# Patient Record
Sex: FEMALE | Race: WHITE | NOT HISPANIC OR LATINO | Employment: FULL TIME | URBAN - METROPOLITAN AREA
[De-identification: names, ages, dates, MRNs, and addresses within clinical notes are randomized per-mention and may not be internally consistent; named-entity substitution may affect disease eponyms.]

---

## 2018-01-15 NOTE — MISCELLANEOUS
Message   Recorded as Task   Date: 07/19/2016 03:31 PM, Created By: Elita Hamman   Task Name: Med Renewal Request   Assigned To: Saima Cisneros   Regarding Patient: Sumaya Gresham, Status: Active   Comment:    Mariel Paulino - 19 Jul 2016 3:31 PM     TASK CREATED  Caller: Self; Renew Medication; [phone] (Home)  pt requesting refills on Lo Loestrin 24 sent to the [facility] pharmacy-scheduled for 8/4 with ABDIAS Guallpa pt is @[phone]  Edwige Body - 19 Jul 2016 3:43 PM     TASK EDITED                 sent to pharm        Active Problems    1  Encounter for routine gynecological examination (V72 31) (Z01 419)   2  Oral contraceptive prescribed (V25 01) (Z30 011)   3  Pap smear, as part of routine gynecological examination (V76 2) (Z01 419)    Current Meds   1  Cymbalta 30 MG Oral Capsule Delayed Release Particles (DULoxetine HCl); Therapy: [id number] to (Evaluate:[id number]) Recorded   2  Lo Loestrin Fe 1 MG-10 MCG / 10 MCG Oral Tablet; TAKE 1 TABLET DAILY  BRAND   NECESSARY; Therapy: [id number] to (Evaluate:[id number])  Requested for: [id number]; Last   Rx:[id number] Ordered    Allergies    1  No Known Drug Allergies    Plan  Oral contraceptive prescribed    · Lo Loestrin Fe 1 MG-10 MCG / 10 MCG Oral Tablet; TAKE 1 TABLET  DAILY  BRAND NECESSARY    Signatures   Electronically signed by : Ginette Jung, ; Jul 19 2016  3:43PM EST                       (Author)

## 2018-03-06 ENCOUNTER — APPOINTMENT (EMERGENCY)
Dept: RADIOLOGY | Facility: HOSPITAL | Age: 43
End: 2018-03-06
Payer: COMMERCIAL

## 2018-03-06 ENCOUNTER — HOSPITAL ENCOUNTER (EMERGENCY)
Facility: HOSPITAL | Age: 43
Discharge: HOME | End: 2018-03-06
Admitting: EMERGENCY MEDICINE
Payer: COMMERCIAL

## 2018-03-06 VITALS
OXYGEN SATURATION: 98 % | HEIGHT: 61 IN | SYSTOLIC BLOOD PRESSURE: 111 MMHG | WEIGHT: 104 LBS | BODY MASS INDEX: 19.63 KG/M2 | RESPIRATION RATE: 18 BRPM | DIASTOLIC BLOOD PRESSURE: 60 MMHG | TEMPERATURE: 98 F | HEART RATE: 68 BPM

## 2018-03-06 DIAGNOSIS — R42 DIZZINESS: Primary | ICD-10-CM

## 2018-03-06 LAB
ANION GAP SERPL CALCULATED.3IONS-SCNC: 7 MMOL/L (ref 4–13)
BASOPHILS # BLD AUTO: 0 THOUSANDS/ΜL (ref 0–0.1)
BASOPHILS NFR BLD AUTO: 1 % (ref 0–1)
BUN SERPL-MCNC: 14 MG/DL (ref 5–25)
CALCIUM SERPL-MCNC: 8.7 MG/DL (ref 8.3–10.1)
CHLORIDE SERPL-SCNC: 107 MMOL/L (ref 100–108)
CO2 SERPL-SCNC: 30 MMOL/L (ref 21–32)
CREAT SERPL-MCNC: 0.61 MG/DL (ref 0.6–1.3)
EOSINOPHIL # BLD AUTO: 0 THOUSAND/ΜL (ref 0–0.61)
EOSINOPHIL NFR BLD AUTO: 1 % (ref 0–6)
ERYTHROCYTE [DISTWIDTH] IN BLOOD BY AUTOMATED COUNT: 11.8 % (ref 11.6–15.1)
EXT PREG TEST URINE: NEGATIVE
GFR SERPL CREATININE-BSD FRML MDRD: 113 ML/MIN/1.73SQ M
GLUCOSE SERPL-MCNC: 94 MG/DL (ref 65–140)
HCT VFR BLD AUTO: 39.2 % (ref 37–47)
HGB BLD-MCNC: 13.2 G/DL (ref 12–16)
LYMPHOCYTES # BLD AUTO: 1.3 THOUSANDS/ΜL (ref 0.6–4.47)
LYMPHOCYTES NFR BLD AUTO: 26 % (ref 14–44)
MCH RBC QN AUTO: 31.4 PG (ref 27–31)
MCHC RBC AUTO-ENTMCNC: 33.6 G/DL (ref 31.4–37.4)
MCV RBC AUTO: 94 FL (ref 82–98)
MONOCYTES # BLD AUTO: 0.4 THOUSAND/ΜL (ref 0.17–1.22)
MONOCYTES NFR BLD AUTO: 8 % (ref 4–12)
NEUTROPHILS # BLD AUTO: 3.3 THOUSANDS/ΜL (ref 1.85–7.62)
NEUTS SEG NFR BLD AUTO: 65 % (ref 43–75)
NRBC BLD AUTO-RTO: 0 /100 WBCS
PLATELET # BLD AUTO: 199 THOUSANDS/UL (ref 130–400)
PMV BLD AUTO: 8.8 FL (ref 8.9–12.7)
POTASSIUM SERPL-SCNC: 3.4 MMOL/L (ref 3.5–5.3)
RBC # BLD AUTO: 4.19 MILLION/UL (ref 4.2–5.4)
SODIUM SERPL-SCNC: 144 MMOL/L (ref 136–145)
TROPONIN I SERPL-MCNC: <0.02 NG/ML
WBC # BLD AUTO: 5.1 THOUSAND/UL (ref 4.8–10.8)

## 2018-03-06 PROCEDURE — 70450 CT HEAD/BRAIN W/O DYE: CPT

## 2018-03-06 PROCEDURE — 96374 THER/PROPH/DIAG INJ IV PUSH: CPT

## 2018-03-06 PROCEDURE — 99284 EMERGENCY DEPT VISIT MOD MDM: CPT

## 2018-03-06 PROCEDURE — 93005 ELECTROCARDIOGRAM TRACING: CPT | Performed by: PHYSICIAN ASSISTANT

## 2018-03-06 PROCEDURE — 85025 COMPLETE CBC W/AUTO DIFF WBC: CPT | Performed by: PHYSICIAN ASSISTANT

## 2018-03-06 PROCEDURE — 84484 ASSAY OF TROPONIN QUANT: CPT | Performed by: PHYSICIAN ASSISTANT

## 2018-03-06 PROCEDURE — 36415 COLL VENOUS BLD VENIPUNCTURE: CPT | Performed by: PHYSICIAN ASSISTANT

## 2018-03-06 PROCEDURE — 96361 HYDRATE IV INFUSION ADD-ON: CPT

## 2018-03-06 PROCEDURE — 81025 URINE PREGNANCY TEST: CPT | Performed by: PHYSICIAN ASSISTANT

## 2018-03-06 PROCEDURE — 80048 BASIC METABOLIC PNL TOTAL CA: CPT | Performed by: PHYSICIAN ASSISTANT

## 2018-03-06 RX ORDER — DIAZEPAM 5 MG/ML
2.5 INJECTION, SOLUTION INTRAMUSCULAR; INTRAVENOUS ONCE
Status: COMPLETED | OUTPATIENT
Start: 2018-03-06 | End: 2018-03-06

## 2018-03-06 RX ORDER — MECLIZINE HYDROCHLORIDE 25 MG/1
25 TABLET ORAL 3 TIMES DAILY PRN
Qty: 15 TABLET | Refills: 0 | Status: SHIPPED | OUTPATIENT
Start: 2018-03-06 | End: 2018-03-11

## 2018-03-06 RX ORDER — MECLIZINE HYDROCHLORIDE 25 MG/1
25 TABLET ORAL ONCE
Status: COMPLETED | OUTPATIENT
Start: 2018-03-06 | End: 2018-03-06

## 2018-03-06 RX ORDER — DULOXETIN HYDROCHLORIDE 30 MG/1
30 CAPSULE, DELAYED RELEASE ORAL DAILY
COMMUNITY

## 2018-03-06 RX ORDER — FLUTICASONE PROPIONATE 50 MCG
1 SPRAY, SUSPENSION (ML) NASAL DAILY
Qty: 16 G | Refills: 0 | Status: SHIPPED | OUTPATIENT
Start: 2018-03-06

## 2018-03-06 RX ADMIN — MECLIZINE HYDROCHLORIDE 25 MG: 25 TABLET ORAL at 11:43

## 2018-03-06 RX ADMIN — DIAZEPAM 2.5 MG: 5 INJECTION, SOLUTION INTRAMUSCULAR; INTRAVENOUS at 13:18

## 2018-03-06 RX ADMIN — SODIUM CHLORIDE 1000 ML: 0.9 INJECTION, SOLUTION INTRAVENOUS at 11:56

## 2018-03-06 NOTE — ED PROVIDER NOTES
History  Chief Complaint   Patient presents with    Dizziness     patient c/o dizziness, nausea and vomiting starting last night  45-year-old female, history of anxiety, presenting today with dizziness that started yesterday accompanied with nausea and vomiting as well as right-sided ear fullness  Relates that she has had these symptoms before in the past however this has worsened  Has been to ear nose and throat doctor however has not been in some time  Believes that she has eustachian tube dysfunction  Minimal amount of ringing in the ears  States that this is been occurring on and off over the past week usually worse in the morning however started once again last evening and has been persistent, significantly worsened with any type of head turning  Able to ambulate however feels off balance at times  Has not had any falls  Denies changes in vision, weakness, numbness, paresthesias, chest pain, shortness of breath, calf pain or swelling, headache, neck pain or stiffness  Prior to Admission Medications   Prescriptions Last Dose Informant Patient Reported? Taking? DULoxetine (CYMBALTA) 30 mg delayed release capsule   Yes Yes   Sig: Take 30 mg by mouth daily      Facility-Administered Medications: None       Past Medical History:   Diagnosis Date    Anxiety        History reviewed  No pertinent surgical history  History reviewed  No pertinent family history  I have reviewed and agree with the history as documented  Social History   Substance Use Topics    Smoking status: Never Smoker    Smokeless tobacco: Never Used    Alcohol use No        Review of Systems   Constitutional: Negative  HENT: Negative  Eyes: Negative  Respiratory: Negative  Cardiovascular: Negative  Gastrointestinal: Positive for nausea and vomiting  Negative for abdominal distention, abdominal pain, anal bleeding, blood in stool, constipation, diarrhea and rectal pain     Genitourinary: Negative  Musculoskeletal: Negative  Skin: Negative  Neurological: Positive for dizziness  Negative for tremors, seizures, syncope, facial asymmetry, speech difficulty, weakness, light-headedness, numbness and headaches  All other systems reviewed and are negative  Physical Exam  ED Triage Vitals [03/06/18 1119]   Temperature Pulse Respirations Blood Pressure SpO2   98 °F (36 7 °C) 68 18 111/60 98 %      Temp Source Heart Rate Source Patient Position - Orthostatic VS BP Location FiO2 (%)   Tympanic Monitor Lying Left arm --      Pain Score       8           Orthostatic Vital Signs  Vitals:    03/06/18 1119   BP: 111/60   Pulse: 68   Patient Position - Orthostatic VS: Lying       Physical Exam   Constitutional: She is oriented to person, place, and time  She appears well-developed and well-nourished  Ambulating in the emergency department after treatment without difficulty  No abnormal gait noted   HENT:   Head: Normocephalic and atraumatic  Right Ear: External ear normal    Left Ear: External ear normal    Nose: Nose normal    Mouth/Throat: Oropharynx is clear and moist    Eyes: Conjunctivae and EOM are normal  Pupils are equal, round, and reactive to light  Right eye exhibits no discharge  Left eye exhibits no discharge  No scleral icterus  Bilateral horizontal nystagmus noted  There is no rotary or vertical nystagmus   Neck: Normal range of motion  Neck supple  Cardiovascular: Normal rate, regular rhythm, normal heart sounds and intact distal pulses  Exam reveals no gallop and no friction rub  No murmur heard  Pulmonary/Chest: Effort normal and breath sounds normal  No respiratory distress  She has no wheezes  She has no rales  She exhibits no tenderness  spo2 is 98% indicating adequate oxygenation  Abdominal: Soft  Bowel sounds are normal  She exhibits no distension and no mass  There is no tenderness  There is no rebound and no guarding  No hernia     Neurological: She is alert and oriented to person, place, and time  She has normal strength  No cranial nerve deficit or sensory deficit  She displays a negative Romberg sign  GCS eye subscore is 4  GCS verbal subscore is 5  GCS motor subscore is 6  Good point to point test    Skin: Skin is warm and dry  Capillary refill takes less than 2 seconds  Nursing note and vitals reviewed  ED Medications  Medications   sodium chloride 0 9 % bolus 1,000 mL (0 mL Intravenous Stopped 3/6/18 1351)   meclizine (ANTIVERT) tablet 25 mg (25 mg Oral Given 3/6/18 1143)   diazepam (VALIUM) injection 2 5 mg (2 5 mg Intravenous Given 3/6/18 1318)       Diagnostic Studies  Results Reviewed     Procedure Component Value Units Date/Time    POCT pregnancy, urine [[id number]]  (Normal) Resulted:  03/06/18 1229    Lab Status:  Final result Updated:  03/06/18 1229     EXT PREG TEST UR (Ref: Negative) negative    Troponin I [[id number]]  (Normal) Collected:  03/06/18 1153    Lab Status:  Final result Specimen:  Blood from Arm, Left Updated:  03/06/18 1228     Troponin I <0 02 ng/mL     Narrative:         [employer] Chemistry analyzer 99% cutoff is > 0 04 ng/mL in network labs    o cTnI 99% cutoff is useful only when applied to patients in the clinical setting of myocardial ischemia  o cTnI 99% cutoff should be interpreted in the context of clinical history, ECG findings and possibly cardiac imaging to establish correct diagnosis  o cTnI 99% cutoff may be suggestive but clearly not indicative of a coronary event without the clinical setting of myocardial ischemia      Basic metabolic panel [[id number]]  (Abnormal) Collected:  03/06/18 1153    Lab Status:  Final result Specimen:  Blood from Arm, Left Updated:  03/06/18 1218     Sodium 144 mmol/L      Potassium 3 4 (L) mmol/L      Chloride 107 mmol/L      CO2 30 mmol/L      Anion Gap 7 mmol/L      BUN 14 mg/dL      Creatinine 0 61 mg/dL      Glucose 94 mg/dL      Calcium 8 7 mg/dL      eGFR 113 ml/min/1 73sq m Narrative:         National Kidney Disease Education Program recommendations are as follows:  GFR calculation is accurate only with a steady state creatinine  Chronic Kidney disease less than 60 ml/min/1 73 sq  meters  Kidney failure less than 15 ml/min/1 73 sq  meters  CBC and differential [[id number]]  (Abnormal) Collected:  03/06/18 1153    Lab Status:  Final result Specimen:  Blood from Arm, Left Updated:  03/06/18 1203     WBC 5 10 Thousand/uL      RBC 4 19 (L) Million/uL      Hemoglobin 13 2 g/dL      Hematocrit 39 2 %      MCV 94 fL      MCH 31 4 (H) pg      MCHC 33 6 g/dL      RDW 11 8 %      MPV 8 8 (L) fL      Platelets 613 Thousands/uL      nRBC 0 /100 WBCs      Neutrophils Relative 65 %      Lymphocytes Relative 26 %      Monocytes Relative 8 %      Eosinophils Relative 1 %      Basophils Relative 1 %      Neutrophils Absolute 3 30 Thousands/µL      Lymphocytes Absolute 1 30 Thousands/µL      Monocytes Absolute 0 40 Thousand/µL      Eosinophils Absolute 0 00 Thousand/µL      Basophils Absolute 0 00 Thousands/µL                  CT head without contrast   Final Result by Catie Recinos MD (03/06 1256)      No CT evidence of acute intracranial process                    Workstation performed: [id number]                    Procedures  ECG 12 Lead Documentation  Date/Time: 3/6/2018 11:56 AM  Performed by: Magy Askew  Authorized by: Magy Askew     Indications / Diagnosis:  Dizziness   Patient location:  ED  Previous ECG:     Previous ECG:  Unavailable    Comparison to cardiac monitor: Yes    Interpretation:     Interpretation: normal    Rate:     ECG rate:  60    ECG rate assessment: normal    Rhythm:     Rhythm: sinus rhythm    Ectopy:     Ectopy: none    QRS:     QRS axis:  Normal    QRS intervals:  Normal  Conduction:     Conduction: normal    ST segments:     ST segments:  Normal  T waves:     T waves: normal             Phone Contacts  ED Phone Contact    ED Course  ED Course as of Mar 06 1422   Tue Mar 06, 2018   1252 Patient re-evaluated, continues with nausea and dizziness however somewhat decreased  Will give valium    1334 Patient feeling much better, eating lunch          HEART Risk Score    Flowsheet Row Most Recent Value   History  0 Filed at: 03/06/2018 1347   ECG  0 Filed at: 03/06/2018 1347   Age  0 Filed at: 03/06/2018 1347   Risk Factors  0 Filed at: 03/06/2018 1347   Troponin  0 Filed at: 03/06/2018 1347   Heart Score Risk Calculator   History  0 Filed at: 03/06/2018 1347   ECG  0 Filed at: 03/06/2018 1347   Age  0 Filed at: 03/06/2018 1347   Risk Factors  0 Filed at: 03/06/2018 1347   Troponin  0 Filed at: 03/06/2018 1347   HEART Score  0 Filed at: 03/06/2018 1347   HEART Score  0 Filed at: 03/06/2018 1347                            MDM  Number of Diagnoses or Management Options  Dizziness:   Diagnosis management comments: Discussed with patient results of the imaging studies and the lab test   She is feeling much better after IV fluids and meclizine however continues with some dizziness  Valium was then given  Patient re-evaluated once again, she is eating without any nausea or vomiting and symptoms are completely relieved  Vertigo vs eustachian tube or vestibular dysfunction vs serous effusion  Will have patient f/u with ENT  Will start on meclizine and flonase  Patient was given strict return precautions for any worsening of symptoms  Patient verbalizes understanding and agrees with the above assessment and plan         Amount and/or Complexity of Data Reviewed  Clinical lab tests: reviewed and ordered  Tests in the radiology section of CPT®: reviewed and ordered  Review and summarize past medical records: yes  Independent visualization of images, tracings, or specimens: yes      CritCare Time    Disposition  Final diagnoses:   Dizziness     Time reflects when diagnosis was documented in both MDM as applicable and the Disposition within this note     Time User Action Codes Description Comment    3/6/2018  1:36 PM Pramod Bender Add [R42] Dizziness       ED Disposition     ED Disposition Condition Comment    Discharge  Lalo Sorluis discharge to home/self care  Condition at discharge: Good        Follow-up Information     Follow up With Specialties Details Why Contact Info Additional [address] Emergency Department Emergency Medicine Go to If symptoms worsen such as weakness, changes in vision  [address] [facility]  [phone] [facility] ED, [facility]    Tiffany Culver MD Internal Medicine Schedule an appointment as soon as possible for a visit in 1 day  [address] [facility] [address] [facility]       Boogie Helms MD Otolaryngology Schedule an appointment as soon as possible for a visit in 1 day  [address] [facility]  [address]           Patient's Medications   Discharge Prescriptions    FLUTICASONE (FLONASE) 50 MCG/ACT NASAL SPRAY    1 spray into each nostril daily       Start Date: 3/6/2018  End Date: --       Order Dose: 1 spray       Quantity: 16 g    Refills: 0    MECLIZINE (ANTIVERT) 25 MG TABLET    Take 1 tablet (25 mg total) by mouth 3 (three) times a day as needed for dizziness for up to 5 days       Start Date: 3/6/2018  End Date: 3/11/2018       Order Dose: 25 mg       Quantity: 15 tablet    Refills: 0     No discharge procedures on file      ED Provider  Electronically Signed by           Manjinder Richards PA-C  03/06/18 8677

## 2018-03-06 NOTE — DISCHARGE INSTRUCTIONS
Dizziness, Ambulatory Care   GENERAL INFORMATION:   Dizziness  is a term used to describe a feeling of being off balance or unsteady  Common causes of dizziness are an inner ear fluid imbalance or a lack of oxygen in your blood  Your dizziness may be acute (lasts 3 days or less) or chronic (lasts longer than 3 days)  You may have dizzy spells that last from seconds to a few hours  Common symptoms related to dizziness include the following:   · A feeling that your surroundings are moving even though you are standing still    · Ringing in your ears or hearing loss     · Feeling faint or lightheaded     · Weakness or unsteadiness     · Double vision or eye movements you cannot control    · Nausea or vomiting     · Confusion  Seek immediate care for the following symptoms:   · You have a headache that does not go away with medicine  · You have shaking chills and a fever  · You vomit over and over with no relief  · Your vomit or bowel movements are red or black  · You have pain in your chest, back, or abdomen  · You have numbness, especially in your face, arms, or legs  · You have trouble moving your arms or legs  Treatment for dizziness  depends on the cause of your dizziness  You may need medicines to decrease your dizziness or nausea  You may need to be admitted to the hospital for treatment  Manage your symptoms:  Get up slowly from sitting or lying down  Do not drive or operate machinery when you are dizzy  Follow up with your healthcare provider as directed:  Write down your questions so you remember to ask them during your visits  CARE AGREEMENT:   You have the right to help plan your care  Learn about your health condition and how it may be treated  Discuss treatment options with your caregivers to decide what care you want to receive  You always have the right to refuse treatment  The above information is an  only   It is not intended as medical advice for individual conditions or treatments  Talk to your doctor, nurse or pharmacist before following any medical regimen to see if it is safe and effective for you  © 2014 [address] is for End User's use only and may not be sold, redistributed or otherwise used for commercial purposes  All illustrations and images included in CareNotes® are the copyrighted property of A D A M , Inc  or Filemon Angulo

## 2018-03-07 LAB
ATRIAL RATE: 60 BPM
P AXIS: 67 DEGREES
PR INTERVAL: 154 MS
QRS AXIS: 68 DEGREES
QRSD INTERVAL: 80 MS
QT INTERVAL: 406 MS
QTC INTERVAL: 406 MS
T WAVE AXIS: 58 DEGREES
VENTRICULAR RATE: 60 BPM

## 2018-03-07 PROCEDURE — 93010 ELECTROCARDIOGRAM REPORT: CPT | Performed by: INTERNAL MEDICINE

## 2018-03-19 ENCOUNTER — EVALUATION (OUTPATIENT)
Dept: PHYSICAL THERAPY | Facility: CLINIC | Age: 43
End: 2018-03-19
Payer: COMMERCIAL

## 2018-03-19 DIAGNOSIS — R42 DIZZINESS: ICD-10-CM

## 2018-03-19 DIAGNOSIS — R42 DIZZINESS AND GIDDINESS: Primary | ICD-10-CM

## 2018-03-19 PROCEDURE — 97162 PT EVAL MOD COMPLEX 30 MIN: CPT

## 2018-03-19 NOTE — PROGRESS NOTES
PT Evaluation     Today's date: 3/19/2018  Patient name: Charlene Walter  : 1975  MRN: [id number]  Referring provider: Dolores Guevara MD  Dx: No diagnosis found  Assessment  Impairments: abnormal gait, abnormal movement and activity intolerance    Assessment details: Presents with right peripheral vestibular hypofunction impairing gait, balance and overall function  Recommend skilled PT to address goals  Educated patient on need to attempt to perform ADL's as able and normalize activity  Driving restriction per physician's recommendation  Understanding of Dx/Px/POC: excellent  Goals  LTG- 1  FGA  to normalize balance  2  Return to work with no symptoms  3  No positional dizziness to facilitate normal function  Plan  Planned therapy interventions: motor coordination training, patient education, therapeutic activities, therapeutic exercise, canalith repositioning and neuromuscular re-education  Frequency: 1x week  Duration in visits: 6        Subjective Evaluation    History of Present Illness  Mechanism of injury: Patient reports two weeks ago waking up dizzy she reports as imbalance  She reports at night experiencing room spinning sensation lasting hours with vomiting  She went to the ER and was released  She was seen by Dr Roderick Arteaga and was diagnosed with right hearing loss and was placed on oral steroids  Currently she reports a slight dizziness rated as 2-3/10  Her symptoms worsen with movement lasting several seconds  She also c/o right aural pressure, denies pain  She underwent CT scan which was unremarkable  No falls reported  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  Recurrent probem    Quality of life: excellent    Pain  No pain reported          Objective   Vision- Full Field tracking  Motor- Normal tone, no clonus or drift   Strength 5/5 t/o  Intact finger to nose/ NOEL's  Denies numbness/ tingling t/o  Balance- (-) Romberg, (-) TREO, (+) TREC= 5 seconds  Gait- Normal gait, note veer with head turns  Gait speed= 1 7 m/second  Vestibular- (+) Head Impulse right, (-) Clayton-Hallpike and Roll Test for BPPV, increased positional dizziness lasting 1-2 seconds              Precautions: Dizziness    Daily Treatment Diary     Manual                                                                                   Exercise Diary              X1             X2             Ball Toss w/ gait             Biodex             Nintu Oyex                                                                                                                                                                                                                    Modalities

## 2018-03-20 ENCOUNTER — TRANSCRIBE ORDERS (OUTPATIENT)
Dept: ADMINISTRATIVE | Facility: HOSPITAL | Age: 43
End: 2018-03-20

## 2018-03-20 DIAGNOSIS — H91.8X1 OTHER SPECIFIED HEARING LOSS OF RIGHT EAR, UNSPECIFIED HEARING STATUS ON CONTRALATERAL SIDE: Primary | ICD-10-CM

## 2018-03-24 ENCOUNTER — HOSPITAL ENCOUNTER (OUTPATIENT)
Dept: RADIOLOGY | Facility: HOSPITAL | Age: 43
Discharge: HOME | End: 2018-03-24
Payer: COMMERCIAL

## 2018-03-24 DIAGNOSIS — H91.8X1 OTHER SPECIFIED HEARING LOSS OF RIGHT EAR, UNSPECIFIED HEARING STATUS ON CONTRALATERAL SIDE: ICD-10-CM

## 2018-03-24 PROCEDURE — 70553 MRI BRAIN STEM W/O & W/DYE: CPT

## 2018-03-24 PROCEDURE — A9585 GADOBUTROL INJECTION: HCPCS | Performed by: RADIOLOGY

## 2018-03-24 RX ADMIN — GADOBUTROL 5 ML: 604.72 INJECTION INTRAVENOUS at 08:50

## 2018-03-27 ENCOUNTER — APPOINTMENT (OUTPATIENT)
Dept: PHYSICAL THERAPY | Facility: CLINIC | Age: 43
End: 2018-03-27
Payer: COMMERCIAL

## 2018-05-24 NOTE — PROGRESS NOTES
Patient is a self DC with no follow with skilled PT with phones call made several times in march and April   All goals unmet

## 2018-06-19 LAB
EXTERNAL HIV CONFIRMATION: NORMAL
EXTERNAL HIV SCREEN: NORMAL

## 2020-07-27 ENCOUNTER — TRANSCRIBE ORDERS (OUTPATIENT)
Dept: ADMINISTRATIVE | Facility: HOSPITAL | Age: 45
End: 2020-07-27

## 2020-07-27 DIAGNOSIS — M81.0 OSTEOPOROSIS, UNSPECIFIED OSTEOPOROSIS TYPE, UNSPECIFIED PATHOLOGICAL FRACTURE PRESENCE: Primary | ICD-10-CM

## 2020-09-16 ENCOUNTER — TRANSCRIBE ORDERS (OUTPATIENT)
Dept: ADMINISTRATIVE | Facility: HOSPITAL | Age: 45
End: 2020-09-16

## 2020-09-16 ENCOUNTER — HOSPITAL ENCOUNTER (OUTPATIENT)
Dept: RADIOLOGY | Facility: HOSPITAL | Age: 45
Discharge: HOME | End: 2020-09-16
Attending: INTERNAL MEDICINE
Payer: COMMERCIAL

## 2020-09-16 DIAGNOSIS — M81.0 OSTEOPOROSIS, UNSPECIFIED OSTEOPOROSIS TYPE, UNSPECIFIED PATHOLOGICAL FRACTURE PRESENCE: ICD-10-CM

## 2020-09-16 PROCEDURE — 77080 DXA BONE DENSITY AXIAL: CPT

## 2020-12-16 ENCOUNTER — NURSE TRIAGE (OUTPATIENT)
Dept: OTHER | Facility: OTHER | Age: 45
End: 2020-12-16

## 2020-12-17 ENCOUNTER — NURSE TRIAGE (OUTPATIENT)
Dept: OTHER | Facility: OTHER | Age: 45
End: 2020-12-17

## 2020-12-17 DIAGNOSIS — Z20.828 EXPOSURE TO SARS-ASSOCIATED CORONAVIRUS: Primary | ICD-10-CM

## 2020-12-19 DIAGNOSIS — Z20.828 EXPOSURE TO SARS-ASSOCIATED CORONAVIRUS: ICD-10-CM

## 2020-12-19 PROCEDURE — U0003 INFECTIOUS AGENT DETECTION BY NUCLEIC ACID (DNA OR RNA); SEVERE ACUTE RESPIRATORY SYNDROME CORONAVIRUS 2 (SARS-COV-2) (CORONAVIRUS DISEASE [COVID-19]), AMPLIFIED PROBE TECHNIQUE, MAKING USE OF HIGH THROUGHPUT TECHNOLOGIES AS DESCRIBED BY CMS-2020-01-R: HCPCS | Performed by: FAMILY MEDICINE

## 2020-12-21 LAB — SARS-COV-2 RNA SPEC QL NAA+PROBE: NOT DETECTED

## 2021-05-13 ENCOUNTER — TRANSCRIBE ORDERS (OUTPATIENT)
Dept: ADMINISTRATIVE | Facility: HOSPITAL | Age: 46
End: 2021-05-13

## 2021-05-13 DIAGNOSIS — F50.9 EATING DISORDER: ICD-10-CM

## 2021-05-13 DIAGNOSIS — R63.4 WEIGHT LOSS: Primary | ICD-10-CM

## 2021-09-27 ENCOUNTER — HOSPITAL ENCOUNTER (EMERGENCY)
Facility: HOSPITAL | Age: 46
Discharge: HOME | End: 2021-09-27
Attending: EMERGENCY MEDICINE
Payer: COMMERCIAL

## 2021-09-27 ENCOUNTER — APPOINTMENT (EMERGENCY)
Dept: RADIOLOGY | Facility: HOSPITAL | Age: 46
End: 2021-09-27
Payer: COMMERCIAL

## 2021-09-27 VITALS
SYSTOLIC BLOOD PRESSURE: 92 MMHG | BODY MASS INDEX: 17.18 KG/M2 | HEIGHT: 61 IN | OXYGEN SATURATION: 100 % | HEART RATE: 85 BPM | WEIGHT: 91 LBS | TEMPERATURE: 98 F | DIASTOLIC BLOOD PRESSURE: 52 MMHG | RESPIRATION RATE: 18 BRPM

## 2021-09-27 DIAGNOSIS — S09.90XA CLOSED HEAD INJURY, INITIAL ENCOUNTER: Primary | ICD-10-CM

## 2021-09-27 PROCEDURE — 70450 CT HEAD/BRAIN W/O DYE: CPT

## 2021-09-27 PROCEDURE — 99284 EMERGENCY DEPT VISIT MOD MDM: CPT | Performed by: PHYSICIAN ASSISTANT

## 2021-09-27 PROCEDURE — G1004 CDSM NDSC: HCPCS

## 2021-09-27 PROCEDURE — 99283 EMERGENCY DEPT VISIT LOW MDM: CPT

## 2021-09-27 RX ORDER — ACETAMINOPHEN 325 MG/1
650 TABLET ORAL ONCE
Status: COMPLETED | OUTPATIENT
Start: 2021-09-27 | End: 2021-09-27

## 2021-09-27 RX ADMIN — ACETAMINOPHEN 650 MG: 325 TABLET, FILM COATED ORAL at 10:08

## 2021-12-08 PROCEDURE — U0003 INFECTIOUS AGENT DETECTION BY NUCLEIC ACID (DNA OR RNA); SEVERE ACUTE RESPIRATORY SYNDROME CORONAVIRUS 2 (SARS-COV-2) (CORONAVIRUS DISEASE [COVID-19]), AMPLIFIED PROBE TECHNIQUE, MAKING USE OF HIGH THROUGHPUT TECHNOLOGIES AS DESCRIBED BY CMS-2020-01-R: HCPCS | Performed by: INTERNAL MEDICINE

## 2021-12-08 PROCEDURE — U0005 INFEC AGEN DETEC AMPLI PROBE: HCPCS | Performed by: INTERNAL MEDICINE

## 2022-09-09 DIAGNOSIS — F32.4 MAJOR DEPRESSIVE DISORDER WITH SINGLE EPISODE, IN PARTIAL REMISSION (HCC): Primary | ICD-10-CM

## 2022-09-09 RX ORDER — DULOXETIN HYDROCHLORIDE 60 MG/1
CAPSULE, DELAYED RELEASE ORAL
Qty: 30 CAPSULE | Refills: 5 | Status: SHIPPED | OUTPATIENT
Start: 2022-09-09

## 2022-10-02 DIAGNOSIS — F32.4 MAJOR DEPRESSIVE DISORDER WITH SINGLE EPISODE, IN PARTIAL REMISSION (HCC): Primary | ICD-10-CM

## 2022-10-03 RX ORDER — DULOXETIN HYDROCHLORIDE 30 MG/1
CAPSULE, DELAYED RELEASE ORAL
Qty: 30 CAPSULE | Refills: 5 | Status: SHIPPED | OUTPATIENT
Start: 2022-10-03

## 2022-10-27 ENCOUNTER — TELEPHONE (OUTPATIENT)
Dept: OBGYN CLINIC | Facility: CLINIC | Age: 47
End: 2022-10-27

## 2022-10-28 ENCOUNTER — TELEPHONE (OUTPATIENT)
Dept: OBGYN CLINIC | Facility: CLINIC | Age: 47
End: 2022-10-28

## 2022-10-28 NOTE — TELEPHONE ENCOUNTER
T/c from patient c/o vaginal burning , no discharge , the burning is external , has been using vaseline , does not believe it is a STI or BV , advised to try coconut oil , if SX have not improved advised to call Monday to reschedule appointment    Homero Maria

## 2023-01-17 ENCOUNTER — OFFICE VISIT (OUTPATIENT)
Dept: PODIATRY | Facility: CLINIC | Age: 48
End: 2023-01-17

## 2023-01-17 VITALS — HEIGHT: 61 IN | RESPIRATION RATE: 16 BRPM | BODY MASS INDEX: 18.88 KG/M2 | WEIGHT: 100 LBS

## 2023-01-17 DIAGNOSIS — M79.671 RIGHT FOOT PAIN: Primary | ICD-10-CM

## 2023-01-17 DIAGNOSIS — S90.211A CONTUSION OF RIGHT GREAT TOE WITH DAMAGE TO NAIL, INITIAL ENCOUNTER: ICD-10-CM

## 2023-01-17 DIAGNOSIS — L03.031 PARONYCHIA OF TOENAIL OF RIGHT FOOT: ICD-10-CM

## 2023-01-17 DIAGNOSIS — B35.1 ONYCHOMYCOSIS: ICD-10-CM

## 2023-01-17 RX ORDER — TERBINAFINE HYDROCHLORIDE 250 MG/1
250 TABLET ORAL DAILY
Qty: 30 TABLET | Refills: 0 | Status: SHIPPED | OUTPATIENT
Start: 2023-01-17 | End: 2023-02-16

## 2023-01-17 NOTE — PROGRESS NOTES
Assessment/Plan: Pain upon ambulation  Paronychia right hallux  History of trauma  Onychomycosis right hallux  Contusion  Plan  Foot exam performed  Patient advised on condition  We will try both oral and topical antifungal   These have been prescribed  Patient will spray her shoes with Lysol  She will take biotin as directed  Return for follow-up         Diagnoses and all orders for this visit:    Right foot pain    Onychomycosis  -     terbinafine (LamISIL) 250 mg tablet; Take 1 tablet (250 mg total) by mouth daily    Paronychia of toenail of right foot    Contusion of right great toe with damage to nail, initial encounter          Subjective: Patient presents for evaluation of a right big toe toenail problem  Patient has history of injury to the toe many months prior and since that time the nail has become disfigured  Can be painful with shoe wear  No Known Allergies      Current Outpatient Medications:   •  terbinafine (LamISIL) 250 mg tablet, Take 1 tablet (250 mg total) by mouth daily, Disp: 30 tablet, Rfl: 0  •  DULoxetine (CYMBALTA) 30 mg delayed release capsule, TAKE ONE CAPSULE DAILY IN THE EVENING, Disp: 30 capsule, Rfl: 5  •  DULoxetine (CYMBALTA) 60 mg delayed release capsule, TAKE 1 CAPSULE DAILY IN THE MORNING , Disp: 30 capsule, Rfl: 5  •  fluticasone (FLONASE) 50 mcg/act nasal spray, 1 spray into each nostril daily (Patient not taking: Reported on 9/27/2021), Disp: 16 g, Rfl: 0  •  meclizine (ANTIVERT) 25 mg tablet, Take 1 tablet (25 mg total) by mouth 3 (three) times a day as needed for dizziness for up to 5 days, Disp: 15 tablet, Rfl: 0  •  Norethin-Eth Estrad-Fe Biphas (LO LOESTRIN FE) 1 MG-10 MCG / 10 MCG TABS, Take 1 tablet by mouth daily (Patient not taking: Reported on 9/27/2021), Disp: , Rfl:     There is no problem list on file for this patient  Patient ID: Nadia Anthony is a 56 y o  female      HPI    The following portions of the patient's history were reviewed and updated as appropriate:     family history is not on file  reports that she has never smoked  She has never used smokeless tobacco  She reports that she does not drink alcohol and does not use drugs  Vitals:    01/17/23 0853   Resp: 16       Review of Systems      Objective:  Patient's shoes and socks removed  Foot ExamPhysical Exam  Vitals and nursing note reviewed  Constitutional:       Appearance: Normal appearance  Cardiovascular:      Rate and Rhythm: Normal rate and regular rhythm  Skin:     Capillary Refill: Capillary refill takes less than 2 seconds  Comments: Right hallux demonstrates dystrophy of nail  Nail is mostly lysed off of the nailbed  There is mycosis noted within the area  Mild paronychia  Negative pus   Neurological:      Mental Status: She is alert  Psychiatric:         Mood and Affect: Mood normal          Behavior: Behavior normal          Thought Content:  Thought content normal          Judgment: Judgment normal

## 2023-02-02 ENCOUNTER — HOSPITAL ENCOUNTER (EMERGENCY)
Facility: HOSPITAL | Age: 48
Discharge: HOME | End: 2023-02-02
Attending: EMERGENCY MEDICINE

## 2023-02-02 VITALS
BODY MASS INDEX: 20.48 KG/M2 | SYSTOLIC BLOOD PRESSURE: 114 MMHG | TEMPERATURE: 96 F | WEIGHT: 108.4 LBS | DIASTOLIC BLOOD PRESSURE: 70 MMHG | OXYGEN SATURATION: 99 % | HEART RATE: 80 BPM | RESPIRATION RATE: 20 BRPM

## 2023-02-02 DIAGNOSIS — S01.01XA LACERATION OF SCALP, INITIAL ENCOUNTER: Primary | ICD-10-CM

## 2023-02-02 RX ORDER — ACETAMINOPHEN 325 MG/1
975 TABLET ORAL ONCE
Status: COMPLETED | OUTPATIENT
Start: 2023-02-02 | End: 2023-02-02

## 2023-02-02 RX ORDER — LIDOCAINE HYDROCHLORIDE AND EPINEPHRINE 10; 10 MG/ML; UG/ML
5 INJECTION, SOLUTION INFILTRATION; PERINEURAL ONCE
Status: COMPLETED | OUTPATIENT
Start: 2023-02-02 | End: 2023-02-02

## 2023-02-02 RX ADMIN — LIDOCAINE HYDROCHLORIDE,EPINEPHRINE BITARTRATE 5 ML: 10; .01 INJECTION, SOLUTION INFILTRATION; PERINEURAL at 23:10

## 2023-02-02 RX ADMIN — ACETAMINOPHEN 975 MG: 325 TABLET, FILM COATED ORAL at 23:10

## 2023-02-02 RX ADMIN — TETANUS TOXOID, REDUCED DIPHTHERIA TOXOID AND ACELLULAR PERTUSSIS VACCINE, ADSORBED 0.5 ML: 5; 2.5; 8; 8; 2.5 SUSPENSION INTRAMUSCULAR at 23:11

## 2023-02-02 NOTE — Clinical Note
Brandon Obrien was seen and treated in our emergency department on 2/2/2023  Diagnosis:     Dale Fraser  may return to work on return date  She may return on this date: 02/04/2023         If you have any questions or concerns, please don't hesitate to call        Aureliano Luong MD    ______________________________           _______________          _______________  Hospital Representative                              Date                                Time

## 2023-02-03 NOTE — ED PROCEDURE NOTE
PROCEDURE  Laceration repair    Date/Time: 2/2/2023 11:12 PM  Performed by: Dennis Rangel MD  Authorized by: Dennis Rangel MD   Consent: Verbal consent obtained    Consent given by: patient  Patient identity confirmed: verbally with patient, arm band and hospital-assigned identification number  Body area: head/neck  Location details: scalp  Laceration length: 1 cm  Tendon involvement: none  Nerve involvement: none  Anesthesia: local infiltration    Anesthesia:  Local Anesthetic: lidocaine 1% with epinephrine    Wound Dehiscence:  Superficial Wound Dehiscence: simple closure  Secondary closure or dehiscence: complex    Procedure Details:  Irrigation solution: saline  Irrigation method: jet lavage  Amount of cleaning: standard  Skin closure: staples  Number of sutures: 2  Technique: simple  Approximation: close  Approximation difficulty: simple  Patient tolerance: patient tolerated the procedure well with no immediate complications      citlallidebra Rangel MD  02/03/23 6477

## 2023-02-03 NOTE — ED PROVIDER NOTES
Final Diagnosis:  1  Laceration of scalp, initial encounter        Chief Complaint   Patient presents with   • Fall     States her bed was moved today for painting, went to bathroom and room dark , went to sit on bed and fell backwards, no LOC, states has laceration back of head  • Head Injury       HPI  Patient presents after she fell and bumped her head when she tried to lay down on her bed that they moved to paint the room  No LOC  No ACAP  No n/v since  No headache, just superficial discomfort  No neck pain  No other injury  Can't recall TDAP  EMS reports if relevant:  **    Chart review reveals :     Orders Only on 12/08/2021   Component Date Value Ref Range Status   • SARS-CoV-2 12/08/2021 Negative  Negative Final       - No language barrier    - History obtained from patient   - There are no limitations to the history obtained  - Previous charting underwent limited review with attention to last ED visits, labs, ekgs, and prior imaging  PMH:   has a past medical history of Anxiety and Dizziness  PSH:   has no past surgical history on file  Social History:  Tobacco Use: Low Risk    • Smoking Tobacco Use: Never   • Smokeless Tobacco Use: Never   • Passive Exposure: Not on file     Alcohol Use: Not on file     Patient with no illicit use   Patient arrives with self    ROS:  Pertinent positives/negatives: Leonie Campbell Some ROS may be present in the HPI and would take precedent over these standard questions asked below  Review of Systems     CONSTITUTIONAL:  No fatigue  No lethargy  No weakness  No unexpected weight loss  No appetite change  EYES:  No pain, erythema, or discharge  No loss of vision  No orbital trauma or pain  ENT:  No tinnitus or decreased hearing  No epistaxis/purulent rhinorrhea  No voice change, airway closing, trismus  CARDIOVASCULAR:  No chest pain  No skin mottling or pallor  No change in exertional capacity  RESPIRATORY:  No hemoptysis  No paroxysmal nocturnal dyspnea  No stridor  No audible wheezing  No production with cough  GASTROINTESTINAL:  Normal appetite  No vomiting, diarrhea  No pain  No bloating  No melena  No hematochezia  GENITOURINARY:  No frequency, urgency, nocturia  No hematuria or dysuria  No discharge  No sores/adenopathy  MUSCULOSKELETAL:  No arthralgias or myalgias that are new  No new deformity  INTEGUMENTARY:  No swelling  No unexpected contusions  No abrasions  No lymphangitis  NEUROLOGIC:  No meningismus  No new numbness of the extremities  No new focal weakness  No postural instability  PSYCHIATRIC:  No SI HI AVH  HEMATOLOGICAL:  No bleeding  No petechiae  No bruising  ALLERGIES:  No urticaria  No sudden abd cramping  No stridor  PE:     Physical exam highlights:   Physical Exam       Vitals:    02/02/23 2256   BP: 114/70   BP Location: Left arm   Pulse: 80   Resp: 20   Temp: (!) 96 °F (35 6 °C)   TempSrc: Tympanic   SpO2: 99%   Weight: 49 2 kg (108 lb 6 4 oz)     Vitals reviewed by me  Nursing note reviewed  Chaperone present for all sensitive exam   Const: No acute distress  Alert  Nontoxic  Not diaphoretic  HEENT: External ears normal  No protrusion drainage swelling  Nose normal  No drainage/traumatic deformity  MMM  Mouth with baseline/symmetric movement  No trismus  Eyes: No squinting  No icterus  No tearing/swelling/drainage  Tracks through the room with normal EOM  Neck: ROM normal  No rigidity  No meningismus  Cards: Rate as per vitals   Compared to monitor sinus unless documented  Regular  Well perfused  Pulm: able to verbalize without additional effort  Effort and excursion normal  No distress  No audible wheezing/ stridor  Normal resp rate without retraction or change in work of breathing  Abd: No distension beyond baseline  No fluctuant wave  Patient without peritoneal pain with shifting/bumping the bed  MSK: ROM normal baseline  No deformity  No contractures from baseline  Skin: No new rashes visible   Well perfused  No wounds visualized on exposed skin  Neuro: Nonfocal  Baseline  CN grossly intact  Moving all four with coordination  Psych: Normal behavior and affect  A:  - Nursing note reviewed  Ddx and MDM  Considered diagnoses  Simple laceration  Staples  tdap update  Wash out    Decision rules used:                    Live decision making and test interpretation:  ED Course as of 02/03/23 0340   u Feb 02, 2023   2310 [place] CT Head Injury/Trauma Rule from [place]  on 2/2/2023  ** All calculations should be rechecked by clinician prior to use **    RESULT SUMMARY:  CT Unnecessary    The [place] Head CT Rule suggests a head CT is not necessary for this patient (sensitivity % for all intracranial traumatic findings, sensitivity 100% for findings requiring neurosurgical intervention)  INPUTS:  Age  -> 0 = No  Patient on blood thinners -> 0 = No  Seizure after injury -> 0 =  No  GCS  -> 0 = No  Suspected open or depressed skull fracture -> 0 = No  Any sign of basilar skull fracture? -> 0 = No  =2 episodes of vomiting -> 0 = No  Age =65 years -> 0 = No  Retrograde amnesia to the event = 30 minutes -> 0 = No  "Dangerous" mechanism? -> 0 = No         Wet reads:   No orders to display       Orders Placed This Encounter   Procedures   • Laceration repair     Labs Reviewed - No data to display    *Each of these labs was reviewed  Particular standout labs will be noted in the ED Course    Final Diagnosis:  1  Laceration of scalp, initial encounter        Tigertext phone or in person consultation performed as follows:    Followup outpatient discussed with patient as well as any following practitioners:     P:  - hospital tx includes   Medications   lidocaine-epinephrine (XYLOCAINE/EPINEPHRINE) 1 %-1:100,000 injection 5 mL (5 mL Infiltration Given 2/2/23 2310)   tetanus-diphtheria-acellular pertussis (BOOSTRIX) IM injection 0 5 mL (0 5 mL Intramuscular Given 2/2/23 2311)   acetaminophen (TYLENOL) tablet 975 mg (975 mg Oral Given 2/2/23 9340)         - disposition  Time reflects when diagnosis was documented in both MDM as applicable and the Disposition within this note     Time User Action Codes Description Comment    2/2/2023 11:11 PM Annette Lou Add [[id number] [id number]] Laceration of scalp, initial encounter       ED Disposition     ED Disposition   Discharge    Condition   Stable    Date/Time   Thu Feb 2, 2023 11:11 PM    Comment   Kiesha Rebekah discharge to home/self care  Follow-up Information     Follow up With Specialties Details Why Contact Info    Alda Pulido MD Internal Medicine Schedule an appointment as soon as possible for a visit  For suture removal [id number] [address]  [phone]            - patient will call their PCP to let them know they were in the emergency department  We discuss return precautions and patient is agreeable with plan and aformentioned disposition  - additional treatment intended, if consistent with primary provider:  - patient to follow with :      Discharge Medication List as of 2/2/2023 11:11 PM      CONTINUE these medications which have NOT CHANGED    Details   !! DULoxetine (CYMBALTA) 30 mg delayed release capsule TAKE ONE CAPSULE DAILY IN THE EVENING, Normal      !!  DULoxetine (CYMBALTA) 60 mg delayed release capsule TAKE 1 CAPSULE DAILY IN THE MORNING , Normal      fluticasone (FLONASE) 50 mcg/act nasal spray 1 spray into each nostril daily, Starting Tue 3/6/2018, Normal      meclizine (ANTIVERT) 25 mg tablet Take 1 tablet (25 mg total) by mouth 3 (three) times a day as needed for dizziness for up to 5 days, Starting Tue 3/6/2018, Until Sun 3/11/2018, Normal      Norethin-Eth Estrad-Fe Biphas (LO LOESTRIN FE) 1 MG-10 MCG / 10 MCG TABS Take 1 tablet by mouth daily, Starting Thu 4/4/2013, Historical Med      terbinafine (LamISIL) 250 mg tablet Take 1 tablet (250 mg total) by mouth daily, Starting Tue 2023, Until Thu 2023, Normal       !! - Potential duplicate medications found  Please discuss with provider  No discharge procedures on file  Prior to Admission Medications   Prescriptions Last Dose Informant Patient Reported? Taking? DULoxetine (CYMBALTA) 30 mg delayed release capsule   No No   Sig: TAKE ONE CAPSULE DAILY IN THE EVENING   DULoxetine (CYMBALTA) 60 mg delayed release capsule   No No   Sig: TAKE 1 CAPSULE DAILY IN THE MORNING  Norethin-Eth Estrad-Fe Biphas (LO LOESTRIN FE) 1 MG-10 MCG / 10 MCG TABS   Yes No   Sig: Take 1 tablet by mouth daily   Patient not taking: Reported on 2021   fluticasone (FLONASE) 50 mcg/act nasal spray   No No   Si spray into each nostril daily   Patient not taking: Reported on 2021   meclizine (ANTIVERT) 25 mg tablet   No No   Sig: Take 1 tablet (25 mg total) by mouth 3 (three) times a day as needed for dizziness for up to 5 days   terbinafine (LamISIL) 250 mg tablet   No No   Sig: Take 1 tablet (250 mg total) by mouth daily      Facility-Administered Medications: None       Portions of the record may have been created with voice recognition software  Occasional wrong word or "sound a like" substitutions may have occurred due to the inherent limitations of voice recognition software  Read the chart carefully and recognize, using context, where substitutions have occurred      Electronically signed by:  MD Dennis Murphy MD  [id number]

## 2023-02-07 ENCOUNTER — OFFICE VISIT (OUTPATIENT)
Dept: INTERNAL MEDICINE CLINIC | Facility: CLINIC | Age: 48
End: 2023-02-07

## 2023-02-07 VITALS
WEIGHT: 107 LBS | BODY MASS INDEX: 20.2 KG/M2 | OXYGEN SATURATION: 100 % | TEMPERATURE: 98 F | DIASTOLIC BLOOD PRESSURE: 78 MMHG | HEIGHT: 61 IN | SYSTOLIC BLOOD PRESSURE: 122 MMHG | HEART RATE: 78 BPM | RESPIRATION RATE: 16 BRPM

## 2023-02-07 DIAGNOSIS — F32.4 MAJOR DEPRESSIVE DISORDER WITH SINGLE EPISODE, IN PARTIAL REMISSION (HCC): ICD-10-CM

## 2023-02-07 DIAGNOSIS — F41.9 ANXIETY: ICD-10-CM

## 2023-02-07 DIAGNOSIS — Z12.11 SCREENING FOR COLON CANCER: ICD-10-CM

## 2023-02-07 DIAGNOSIS — J20.9 ACUTE INFECTIVE TRACHEOBRONCHITIS: Primary | ICD-10-CM

## 2023-02-07 DIAGNOSIS — M85.88 OSTEOPENIA OF LUMBAR SPINE: ICD-10-CM

## 2023-02-07 DIAGNOSIS — Z12.31 SCREENING MAMMOGRAM, ENCOUNTER FOR: ICD-10-CM
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
## 2023-02-07 PROBLEM — J30.9 ALLERGIC RHINITIS: Status: ACTIVE | Noted: 2021-12-20
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
## 2023-02-07 PROBLEM — M85.80 OSTEOPENIA: Status: ACTIVE | Noted: 2020-09-26

## 2023-02-07 RX ORDER — PROPRANOLOL HYDROCHLORIDE 10 MG/1
10 TABLET ORAL DAILY PRN
COMMUNITY

## 2023-02-07 RX ORDER — CEFUROXIME AXETIL 500 MG/1
500 TABLET ORAL EVERY 12 HOURS SCHEDULED
Qty: 14 TABLET | Refills: 0 | Status: SHIPPED | OUTPATIENT
Start: 2023-02-07 | End: 2023-02-14

## 2023-02-07 RX ORDER — DULOXETIN HYDROCHLORIDE 60 MG/1
60 CAPSULE, DELAYED RELEASE ORAL EVERY MORNING
Qty: 30 CAPSULE | Refills: 1 | Status: SHIPPED | OUTPATIENT
Start: 2023-02-07

## 2023-02-07 RX ORDER — GUAIFENESIN AND CODEINE PHOSPHATE 100; 10 MG/5ML; MG/5ML
5 SOLUTION ORAL 3 TIMES DAILY PRN
Qty: 118 ML | Refills: 1 | Status: SHIPPED | OUTPATIENT
Start: 2023-02-07

## 2023-02-07 RX ORDER — HYDROXYZINE PAMOATE 25 MG/1
25 CAPSULE ORAL 3 TIMES DAILY PRN
Qty: 30 CAPSULE | Refills: 1 | Status: SHIPPED | OUTPATIENT
Start: 2023-02-07

## 2023-02-07 RX ORDER — DULOXETIN HYDROCHLORIDE 30 MG/1
30 CAPSULE, DELAYED RELEASE ORAL DAILY
Qty: 30 CAPSULE | Refills: 1 | Status: SHIPPED | OUTPATIENT
Start: 2023-02-07

## 2023-02-07 NOTE — PATIENT INSTRUCTIONS
Acute Cough   WHAT YOU NEED TO KNOW:   An acute cough can last up to 3 weeks  Common causes of an acute cough include a cold, allergies, or a lung infection  DISCHARGE INSTRUCTIONS:   Return to the emergency department if:   You have trouble breathing or feel short of breath  You cough up blood, or you see blood in your mucus  You faint or feel weak or dizzy  You have chest pain when you cough or take a deep breath  You have new wheezing  Contact your healthcare provider if:   You have a fever  Your cough lasts longer than 4 weeks  Your symptoms do not improve with treatment  You have questions or concerns about your condition or care  Medicines:   Medicines  may be needed to stop the cough, decrease swelling in your airways, or help open your airways  Medicine may also be given to help you cough up mucus  Ask your healthcare provider what over-the-counter medicines you can take  If you have an infection caused by bacteria, you may need antibiotics  Take your medicine as directed  Contact your healthcare provider if you think your medicine is not helping or if you have side effects  Tell him or her if you are allergic to any medicine  Keep a list of the medicines, vitamins, and herbs you take  Include the amounts, and when and why you take them  Bring the list or the pill bottles to follow-up visits  Carry your medicine list with you in case of an emergency  Manage your symptoms:   Do not smoke and stay away from others who smoke  Nicotine and other chemicals in cigarettes and cigars can cause lung damage and make your cough worse  Ask your healthcare provider for information if you currently smoke and need help to quit  E-cigarettes or smokeless tobacco still contain nicotine  Talk to your healthcare provider before you use these products  Drink extra liquids as directed  Liquids will help thin and loosen mucus so you can cough it up   Liquids will also help prevent dehydration  Examples of good liquids to drink include water, fruit juice, and broth  Do not drink liquids that contain caffeine  Caffeine can increase your risk for dehydration  Ask your healthcare provider how much liquid to drink each day  Rest as directed  Do not do activities that make your cough worse, such as exercise  Use a humidifier or vaporizer  Use a cool mist humidifier or a vaporizer to increase air moisture in your home  This may make it easier for you to breathe and help decrease your cough  Eat 2 to 5 mL of honey 2 times each day  Honey can help thin mucus and decrease your cough  Use cough drops or lozenges  These can help decrease throat irritation and your cough  Follow up with your healthcare provider as directed:  Write down your questions so you remember to ask them during your visits  © Copyright [employer] 2022 Information is for End User's use only and may not be sold, redistributed or otherwise used for commercial purposes  All illustrations and images included in CareNotes® are the copyrighted property of [employer]  or [address] [place], [place] [address]dedra Carter   The above information is an  only  It is not intended as medical advice for individual conditions or treatments  Talk to your doctor, nurse or pharmacist before following any medical regimen to see if it is safe and effective for you

## 2023-02-07 NOTE — PROGRESS NOTES
Dr Natanael Atkins Office Visit Note  23     Emile Solis 56 y o  female MRN: [id number]  : 1975    Assessment:     1  Acute infective tracheobronchitis  Assessment & Plan:  Coughing with yellowish expectoration treated with Ceftin and cough medicine    Orders:  -     cefuroxime (CEFTIN) 500 mg tablet; Take 1 tablet (500 mg total) by mouth every 12 (twelve) hours for 7 days  -     guaifenesin-codeine (GUAIFENESIN AC) 100-10 MG/5ML liquid; Take 5 mL by mouth 3 (three) times a day as needed for cough    2  Major depressive disorder with single episode, in partial remission ([facility])  Assessment & Plan:  Symptoms controlled Cymbalta 60 mg in the evening 30 in the morning    Orders:  -     DULoxetine (CYMBALTA) 60 mg delayed release capsule; Take 1 capsule (60 mg total) by mouth every morning  -     DULoxetine (CYMBALTA) 30 mg delayed release capsule; Take 1 capsule (30 mg total) by mouth daily    3  Anxiety  Assessment & Plan:  Occasionally getting anxiety panic attack especially in the evening or nighttime advised Vistaril 25 mg at bedtime as needed    Orders:  -     hydrOXYzine pamoate (VISTARIL) 25 mg capsule; Take 1 capsule (25 mg total) by mouth 3 (three) times a day as needed for itching    4  Screening mammogram, encounter for  -     Mammo screening bilateral w 3d & cad; Future; Expected date: 2023    5  Screening for colon cancer  -     [facility]    6  Fall, subsequent encounter  Assessment & Plan:  Belkis [place] accidentally 4 days ago went to the emergency room laceration scalp sutured no headache no other symptoms no history of loss of consciousness denies dizziness      7  Osteopenia of lumbar spine  Assessment & Plan:  Advise vitamin D with calcium daily            Discussion Summary and Plan: Today's care plan and medications were reviewed with patient in detail and all their questions answered to their satisfaction      Chief Complaint   Patient presents with   • Fever     Fever 101 this morning coughing sinus pressure  Took covid test negative  Subjective:  Patient came in for complaining of coughing yellowish expectoration 3 days had a low-grade fever sore throat tested negative for COVID and also 4 days ago fell at home accidentally laceration scalp went to the emergency room was sutured treated at present time no symptoms of headache or dizziness improved depression controlled on Cymbalta      The following portions of the patient's history were reviewed and updated as appropriate: allergies, current medications, past family history, past medical history, past social history, past surgical history and problem list     Review of Systems   Constitutional: Positive for fever  Negative for activity change, appetite change, chills, diaphoresis, fatigue and unexpected weight change  HENT: Negative for congestion, dental problem, drooling, ear discharge, ear pain, facial swelling, hearing loss, mouth sores, nosebleeds, postnasal drip, rhinorrhea, sinus pressure, sneezing, sore throat, tinnitus, trouble swallowing and voice change  Eyes: Negative for photophobia, pain, discharge, redness, itching and visual disturbance  Respiratory: Positive for cough  Negative for apnea, choking, chest tightness, shortness of breath, wheezing and stridor  Cardiovascular: Negative for chest pain, palpitations and leg swelling  Gastrointestinal: Negative for abdominal distention, abdominal pain, anal bleeding, blood in stool, constipation, diarrhea, nausea, rectal pain and vomiting  Endocrine: Negative for cold intolerance, heat intolerance, polydipsia, polyphagia and polyuria  Genitourinary: Negative for decreased urine volume, difficulty urinating, dysuria, enuresis, flank pain, frequency, genital sores, hematuria and urgency  Musculoskeletal: Negative for arthralgias, back pain, gait problem, joint swelling, myalgias, neck pain and neck stiffness  Skin: Negative for color change, pallor, rash and wound  Allergic/Immunologic: Negative  Negative for environmental allergies, food allergies and immunocompromised state  Neurological: Negative for dizziness, tremors, seizures, syncope, facial asymmetry, speech difficulty, weakness, light-headedness, numbness and headaches  Psychiatric/Behavioral: Negative for agitation, behavioral problems, confusion, decreased concentration, dysphoric mood, hallucinations, self-injury, sleep disturbance and suicidal ideas  The patient is nervous/anxious  The patient is not hyperactive  Historical Information   Patient Active Problem List   Diagnosis   • Acute infective tracheobronchitis   • Anxiety   • Fall   • Major depressive disorder with single episode, in partial remission ([facility] [address] )   • Osteopenia   • Allergic rhinitis     Past Medical History:   Diagnosis Date   • Anxiety    • Dizziness      No past surgical history on file  Social History     Substance and Sexual Activity   Alcohol Use No     Social History     Substance and Sexual Activity   Drug Use No     Social History     Tobacco Use   Smoking Status Never   Smokeless Tobacco Never     No family history on file    Health Maintenance Due   Topic   • Hepatitis C Screening    • COVID-19 Vaccine (1)   • HIV Screening    • Annual Physical    • Cervical Cancer Screening    • Breast Cancer Screening: Mammogram    • Colorectal Cancer Screening    • Influenza Vaccine (1)      Meds/Allergies       Current Outpatient Medications:   •  cefuroxime (CEFTIN) 500 mg tablet, Take 1 tablet (500 mg total) by mouth every 12 (twelve) hours for 7 days, Disp: 14 tablet, Rfl: 0  •  DULoxetine (CYMBALTA) 30 mg delayed release capsule, Take 1 capsule (30 mg total) by mouth daily, Disp: 30 capsule, Rfl: 1  •  DULoxetine (CYMBALTA) 60 mg delayed release capsule, Take 1 capsule (60 mg total) by mouth every morning, Disp: 30 capsule, Rfl: 1  •  guaifenesin-codeine (GUAIFENESIN AC) 100-10 MG/5ML liquid, Take 5 mL by mouth 3 (three) times a day as needed for cough, Disp: 118 mL, Rfl: 1  •  hydrOXYzine pamoate (VISTARIL) 25 mg capsule, Take 1 capsule (25 mg total) by mouth 3 (three) times a day as needed for itching, Disp: 30 capsule, Rfl: 1  •  propranolol (INDERAL) 10 mg tablet, Take 10 mg by mouth daily as needed, Disp: , Rfl:   •  fluticasone (FLONASE) 50 mcg/act nasal spray, 1 spray into each nostril daily (Patient not taking: Reported on 9/27/2021), Disp: 16 g, Rfl: 0  •  meclizine (ANTIVERT) 25 mg tablet, Take 1 tablet (25 mg total) by mouth 3 (three) times a day as needed for dizziness for up to 5 days, Disp: 15 tablet, Rfl: 0  •  Norethin-Eth Estrad-Fe Biphas 1 MG-10 MCG / 10 MCG TABS, Take 1 tablet by mouth daily (Patient not taking: Reported on 9/27/2021), Disp: , Rfl:   •  terbinafine (LamISIL) 250 mg tablet, Take 1 tablet (250 mg total) by mouth daily (Patient not taking: Reported on 2/7/2023), Disp: 30 tablet, Rfl: 0      Objective:    Vitals:   /78   Pulse 78   Temp 98 °F (36 7 °C)   Resp 16   Ht 5' 1" (1 549 m)   Wt 48 5 kg (107 lb)   LMP 07/27/2021   SpO2 100%   BMI 20 22 kg/m²   Body mass index is 20 22 kg/m²  Vitals:    02/07/23 1547   Weight: 48 5 kg (107 lb)       Physical Exam  Constitutional:       General: She is not in acute distress  Appearance: She is well-developed  She is not ill-appearing, toxic-appearing or diaphoretic  HENT:      Head: Normocephalic and atraumatic  Right Ear: External ear normal       Left Ear: External ear normal       Nose: Nose normal       Mouth/Throat:      Pharynx: No oropharyngeal exudate  Eyes:      General: Lids are normal  Lids are everted, no foreign bodies appreciated  No scleral icterus  Right eye: No discharge  Left eye: No discharge  Conjunctiva/sclera: Conjunctivae normal       Pupils: Pupils are equal, round, and reactive to light  Neck:      Thyroid: No thyromegaly  Vascular: Normal carotid pulses   No carotid bruit, hepatojugular reflux or JVD  Trachea: No tracheal tenderness or tracheal deviation  Cardiovascular:      Rate and Rhythm: Normal rate and regular rhythm  Pulses: Normal pulses  Heart sounds: Normal heart sounds  No murmur heard  No friction rub  No gallop  Pulmonary:      Effort: Pulmonary effort is normal  No respiratory distress  Breath sounds: No stridor  Rhonchi present  No wheezing or rales  Chest:      Chest wall: No tenderness  Abdominal:      General: Bowel sounds are normal  There is no distension  Palpations: Abdomen is soft  There is no mass  Tenderness: There is no abdominal tenderness  There is no guarding or rebound  Musculoskeletal:         General: No tenderness or deformity  Normal range of motion  Cervical back: Normal range of motion and neck supple  No edema, erythema or rigidity  No spinous process tenderness or muscular tenderness  Normal range of motion  Lymphadenopathy:      Head:      Right side of head: No submental, submandibular, tonsillar, preauricular or posterior auricular adenopathy  Left side of head: No submental, submandibular, tonsillar, preauricular, posterior auricular or occipital adenopathy  Cervical: No cervical adenopathy  Right cervical: No superficial, deep or posterior cervical adenopathy  Left cervical: No superficial, deep or posterior cervical adenopathy  Upper Body:      Right upper body: No pectoral adenopathy  Left upper body: No pectoral adenopathy  Skin:     General: Skin is warm and dry  Coloration: Skin is not pale  Findings: No erythema or rash  Neurological:      Mental Status: She is alert and oriented to person, place, and time  Cranial Nerves: No cranial nerve deficit  Sensory: No sensory deficit  Motor: No tremor, abnormal muscle tone or seizure activity        Coordination: Coordination normal       Gait: Gait normal       Deep Tendon Reflexes: Reflexes are normal and symmetric  Reflexes normal    Psychiatric:         Behavior: Behavior normal          Thought Content: Thought content normal          Judgment: Judgment normal          Lab Review   No visits with results within 2 Month(s) from this visit  Latest known visit with results is:   Orders Only on 12/08/2021   Component Date Value Ref Range Status   • SARS-CoV-2 12/08/2021 Negative  Negative Final         Patient Instructions   Acute Cough   WHAT YOU NEED TO KNOW:   An acute cough can last up to 3 weeks  Common causes of an acute cough include a cold, allergies, or a lung infection  DISCHARGE INSTRUCTIONS:   Return to the emergency department if:   · You have trouble breathing or feel short of breath  · You cough up blood, or you see blood in your mucus  · You faint or feel weak or dizzy  · You have chest pain when you cough or take a deep breath  · You have new wheezing  Contact your healthcare provider if:   · You have a fever  · Your cough lasts longer than 4 weeks  · Your symptoms do not improve with treatment  · You have questions or concerns about your condition or care  Medicines:   · Medicines  may be needed to stop the cough, decrease swelling in your airways, or help open your airways  Medicine may also be given to help you cough up mucus  Ask your healthcare provider what over-the-counter medicines you can take  If you have an infection caused by bacteria, you may need antibiotics  · Take your medicine as directed  Contact your healthcare provider if you think your medicine is not helping or if you have side effects  Tell him or her if you are allergic to any medicine  Keep a list of the medicines, vitamins, and herbs you take  Include the amounts, and when and why you take them  Bring the list or the pill bottles to follow-up visits  Carry your medicine list with you in case of an emergency  Manage your symptoms:   · Do not smoke and stay away from others who smoke  Nicotine and other chemicals in cigarettes and cigars can cause lung damage and make your cough worse  Ask your healthcare provider for information if you currently smoke and need help to quit  E-cigarettes or smokeless tobacco still contain nicotine  Talk to your healthcare provider before you use these products  · Drink extra liquids as directed  Liquids will help thin and loosen mucus so you can cough it up  Liquids will also help prevent dehydration  Examples of good liquids to drink include water, fruit juice, and broth  Do not drink liquids that contain caffeine  Caffeine can increase your risk for dehydration  Ask your healthcare provider how much liquid to drink each day  · Rest as directed  Do not do activities that make your cough worse, such as exercise  · Use a humidifier or vaporizer  Use a cool mist humidifier or a vaporizer to increase air moisture in your home  This may make it easier for you to breathe and help decrease your cough  · Eat 2 to 5 mL of honey 2 times each day  Honey can help thin mucus and decrease your cough  · Use cough drops or lozenges  These can help decrease throat irritation and your cough  Follow up with your healthcare provider as directed:  Write down your questions so you remember to ask them during your visits  © Copyright [employer] 2022 Information is for End User's use only and may not be sold, redistributed or otherwise used for commercial purposes  All illustrations and images included in CareNotes® are the copyrighted property of [employer]  or [facility] Keenan Carter   The above information is an  only  It is not intended as medical advice for individual conditions or treatments  Talk to your doctor, nurse or pharmacist before following any medical regimen to see if it is safe and effective for you  Parag Camarillo MD        "This note has been constructed using a voice recognition system  Therefore there may be syntax, spelling, and/or grammatical errors   Please call if you have any questions  "

## 2023-02-07 NOTE — ASSESSMENT & PLAN NOTE
Lourdes Rubin accidentally 4 days ago went to the emergency room laceration scalp sutured no headache no other symptoms no history of loss of consciousness denies dizziness

## 2023-02-28 ENCOUNTER — VBI (OUTPATIENT)
Dept: ADMINISTRATIVE | Facility: OTHER | Age: 48
End: 2023-02-28

## 2023-02-28 NOTE — TELEPHONE ENCOUNTER
Upon review of the In Basket request we were able to locate, review, and update the patient chart as requested for HIV  Any additional questions or concerns should be emailed to the Practice Liaisons via the appropriate education email address, please do not reply via In Basket      Thank you  Cheryl Morales

## 2023-03-02 ENCOUNTER — OFFICE VISIT (OUTPATIENT)
Dept: PODIATRY | Facility: CLINIC | Age: 48
End: 2023-03-02

## 2023-03-02 VITALS — BODY MASS INDEX: 20.2 KG/M2 | RESPIRATION RATE: 17 BRPM | HEIGHT: 61 IN | WEIGHT: 107 LBS

## 2023-03-02 DIAGNOSIS — S90.211A CONTUSION OF RIGHT GREAT TOE WITH DAMAGE TO NAIL, INITIAL ENCOUNTER: ICD-10-CM

## 2023-03-02 DIAGNOSIS — M79.671 RIGHT FOOT PAIN: Primary | ICD-10-CM

## 2023-03-02 DIAGNOSIS — B35.1 ONYCHOMYCOSIS: ICD-10-CM

## 2023-03-02 DIAGNOSIS — L03.031 PARONYCHIA OF TOENAIL OF RIGHT FOOT: ICD-10-CM

## 2023-03-02 RX ORDER — TERBINAFINE HYDROCHLORIDE 250 MG/1
250 TABLET ORAL DAILY
Qty: 30 TABLET | Refills: 0 | Status: SHIPPED | OUTPATIENT
Start: 2023-03-02 | End: 2023-04-01

## 2023-03-02 NOTE — PROGRESS NOTES
Assessment/Plan: Pain upon ambulation  Paronychia right hallux  History of trauma  Onychomycosis right hallux  Contusion      Plan  Foot exam performed  Patient advised on condition  We will try both oral and topical antifungal   These have been prescribed  Patient will spray her shoes with Lysol  She will take biotin as directed  Return for follow-up            Diagnoses and all orders for this visit:     Right foot pain     Onychomycosis  -     terbinafine (LamISIL) 250 mg tablet; Take 1 tablet (250 mg total) by mouth daily     Paronychia of toenail of right foot     Contusion of right great toe with damage to nail, initial encounter            Subjective: Patient presents for evaluation of a right big toe toenail problem  Patient has history of injury to the toe many months prior and since that time the nail has become disfigured    Can be painful with shoe wear      No Known Allergies        Current Outpatient Medications:   •  terbinafine (LamISIL) 250 mg tablet, Take 1 tablet (250 mg total) by mouth daily, Disp: 30 tablet, Rfl: 0  •  DULoxetine (CYMBALTA) 30 mg delayed release capsule, TAKE ONE CAPSULE DAILY IN THE EVENING, Disp: 30 capsule, Rfl: 5  •  DULoxetine (CYMBALTA) 60 mg delayed release capsule, TAKE 1 CAPSULE DAILY IN THE MORNING , Disp: 30 capsule, Rfl: 5  •  fluticasone (FLONASE) 50 mcg/act nasal spray, 1 spray into each nostril daily (Patient not taking: Reported on 9/27/2021), Disp: 16 g, Rfl: 0  •  meclizine (ANTIVERT) 25 mg tablet, Take 1 tablet (25 mg total) by mouth 3 (three) times a day as needed for dizziness for up to 5 days, Disp: 15 tablet, Rfl: 0  •  Norethin-Eth Estrad-Fe Biphas (LO LOESTRIN FE) 1 MG-10 MCG / 10 MCG TABS, Take 1 tablet by mouth daily (Patient not taking: Reported on 9/27/2021), Disp: , Rfl:      There is no problem list on file for this patient             Patient ID: Kb Betts is a 56 y o  female      HPI     The following portions of the patient's history were reviewed and updated as appropriate:      family history is not on file        reports that she has never smoked  She has never used smokeless tobacco  She reports that she does not drink alcohol and does not use drugs      Objective:  Patient's shoes and socks removed  Foot ExamPhysical Exam  Vitals and nursing note reviewed  Constitutional:       Appearance: Normal appearance  Cardiovascular:      Rate and Rhythm: Normal rate and regular rhythm  Skin:     Capillary Refill: Capillary refill takes less than 2 seconds  Comments: Right hallux demonstrates dystrophy of nail  Nail is mostly lysed off of the nailbed  There is mycosis noted within the area  Mild paronychia  Negative pus   Neurological:      Mental Status: She is alert  Psychiatric:         Mood and Affect: Mood normal          Behavior: Behavior normal          Thought Content:  Thought content normal          Judgment: Judgment normal

## 2023-03-10 ENCOUNTER — HOSPITAL ENCOUNTER (OUTPATIENT)
Dept: RADIOLOGY | Facility: HOSPITAL | Age: 48
Discharge: HOME | End: 2023-03-10
Attending: INTERNAL MEDICINE

## 2023-03-10 VITALS — BODY MASS INDEX: 19.83 KG/M2 | HEIGHT: 61 IN | WEIGHT: 105 LBS

## 2023-03-10 DIAGNOSIS — Z12.31 SCREENING MAMMOGRAM, ENCOUNTER FOR: ICD-10-CM

## 2023-04-06 DIAGNOSIS — F32.4 MAJOR DEPRESSIVE DISORDER WITH SINGLE EPISODE, IN PARTIAL REMISSION (HCC): ICD-10-CM

## 2023-04-06 RX ORDER — DULOXETIN HYDROCHLORIDE 60 MG/1
CAPSULE, DELAYED RELEASE ORAL
Qty: 30 CAPSULE | Refills: 1 | Status: SHIPPED | OUTPATIENT
Start: 2023-04-06

## 2023-04-07 ENCOUNTER — OFFICE VISIT (OUTPATIENT)
Dept: PODIATRY | Facility: CLINIC | Age: 48
End: 2023-04-07

## 2023-04-07 VITALS
WEIGHT: 105 LBS | SYSTOLIC BLOOD PRESSURE: 113 MMHG | BODY MASS INDEX: 19.83 KG/M2 | RESPIRATION RATE: 17 BRPM | DIASTOLIC BLOOD PRESSURE: 62 MMHG | HEIGHT: 61 IN

## 2023-04-07 DIAGNOSIS — S90.211D CONTUSION OF RIGHT GREAT TOE WITH DAMAGE TO NAIL, SUBSEQUENT ENCOUNTER: ICD-10-CM

## 2023-04-07 DIAGNOSIS — B35.1 ONYCHOMYCOSIS: Primary | ICD-10-CM

## 2023-04-07 DIAGNOSIS — L03.031 PARONYCHIA OF TOENAIL OF RIGHT FOOT: ICD-10-CM

## 2023-04-07 DIAGNOSIS — M79.671 RIGHT FOOT PAIN: ICD-10-CM

## 2023-04-07 RX ORDER — TERBINAFINE HYDROCHLORIDE 250 MG/1
250 TABLET ORAL DAILY
Qty: 30 TABLET | Refills: 0 | Status: SHIPPED | OUTPATIENT
Start: 2023-04-07 | End: 2023-05-07

## 2023-04-07 NOTE — PROGRESS NOTES
Assessment/Plan: Pain upon ambulation   Paronychia right hallux   History of trauma   Onychomycosis right hallux   Contusion      Plan   Foot exam performed   Patient advised on condition   We will try both oral and topical antifungal   These have been prescribed   Patient will spray her shoes with Lysol   She will take biotin as directed   Return for follow-up            Diagnoses and all orders for this visit:     Right foot pain     Onychomycosis  -     terbinafine (LamISIL) 250 mg tablet;  Take 1 tablet (250 mg total) by mouth daily     Paronychia of toenail of right foot     Contusion of right great toe with damage to nail, initial encounter            Subjective: Patient presents for evaluation of a right big toe toenail problem   Patient has history of injury to the toe many months prior and since that time the nail has become disfigured   Can be painful with shoe wear      No Known Allergies        Current Outpatient Medications:   •  terbinafine (LamISIL) 250 mg tablet, Take 1 tablet (250 mg total) by mouth daily, Disp: 30 tablet, Rfl: 0  •  DULoxetine (CYMBALTA) 30 mg delayed release capsule, TAKE ONE CAPSULE DAILY IN THE EVENING, Disp: 30 capsule, Rfl: 5  •  DULoxetine (CYMBALTA) 60 mg delayed release capsule, TAKE 1 CAPSULE DAILY IN THE MORNING , Disp: 30 capsule, Rfl: 5  •  fluticasone (FLONASE) 50 mcg/act nasal spray, 1 spray into each nostril daily (Patient not taking: Reported on 9/27/2021), Disp: 16 g, Rfl: 0  •  meclizine (ANTIVERT) 25 mg tablet, Take 1 tablet (25 mg total) by mouth 3 (three) times a day as needed for dizziness for up to 5 days, Disp: 15 tablet, Rfl: 0  •  Norethin-Eth Estrad-Fe Biphas (LO LOESTRIN FE) 1 MG-10 MCG / 10 MCG TABS, Take 1 tablet by mouth daily (Patient not taking: Reported on 9/27/2021), Disp: , Rfl:      There is no problem list on file for this patient             Patient ID: Crystal Dickinson is a 47 y o  female      HPI     The following portions of the patient's history were reviewed and updated as appropriate:      family history is not on file        reports that she has never smoked  She has never used smokeless tobacco  She reports that she does not drink alcohol and does not use drugs      Objective:  Patient's shoes and socks removed    Foot ExamPhysical Exam  Vitals and nursing note reviewed  Constitutional:       Appearance: Normal appearance  Cardiovascular:      Rate and Rhythm: Normal rate and regular rhythm  Skin:     Capillary Refill: Capillary refill takes less than 2 seconds       Comments: Right hallux demonstrates dystrophy of nail   Nail is mostly lysed off of the nailbed   There is mycosis noted within the area   Mild paronychia   Negative pus  Line of demarcation noted  New nail is growing in fungus clear  Neurological:      Mental Status: She is alert  Psychiatric:         Mood and Affect: Mood normal          BehaviorDelinda Moyer         Thought Content:  Thought content normal          Judgment: Judgment normal

## 2023-04-08 PROBLEM — J20.9 ACUTE INFECTIVE TRACHEOBRONCHITIS: Status: RESOLVED | Noted: 2023-02-07 | Resolved: 2023-04-08

## 2023-04-10 PROBLEM — R42 VERTIGO: Status: ACTIVE | Noted: 2023-04-10

## 2023-04-10 PROBLEM — F41.1 GENERALIZED ANXIETY DISORDER WITH PANIC ATTACKS: Status: ACTIVE | Noted: 2023-04-10

## 2023-04-10 PROBLEM — F32.5 MAJOR DEPRESSIVE DISORDER WITH SINGLE EPISODE, IN FULL REMISSION (HCC): Status: ACTIVE | Noted: 2023-02-07

## 2023-04-10 PROBLEM — F41.0 GENERALIZED ANXIETY DISORDER WITH PANIC ATTACKS: Status: ACTIVE | Noted: 2023-04-10

## 2023-05-19 ENCOUNTER — OFFICE VISIT (OUTPATIENT)
Dept: PODIATRY | Facility: CLINIC | Age: 48
End: 2023-05-19

## 2023-05-19 ENCOUNTER — OFFICE VISIT (OUTPATIENT)
Dept: INTERNAL MEDICINE CLINIC | Facility: CLINIC | Age: 48
End: 2023-05-19

## 2023-05-19 VITALS
RESPIRATION RATE: 16 BRPM | DIASTOLIC BLOOD PRESSURE: 70 MMHG | SYSTOLIC BLOOD PRESSURE: 100 MMHG | BODY MASS INDEX: 20.73 KG/M2 | HEART RATE: 70 BPM | WEIGHT: 109.8 LBS | HEIGHT: 61 IN | TEMPERATURE: 98 F | OXYGEN SATURATION: 99 %

## 2023-05-19 VITALS
HEIGHT: 61 IN | DIASTOLIC BLOOD PRESSURE: 70 MMHG | WEIGHT: 109 LBS | RESPIRATION RATE: 17 BRPM | BODY MASS INDEX: 20.58 KG/M2 | SYSTOLIC BLOOD PRESSURE: 100 MMHG

## 2023-05-19 DIAGNOSIS — S90.211D CONTUSION OF RIGHT GREAT TOE WITH DAMAGE TO NAIL, SUBSEQUENT ENCOUNTER: Primary | ICD-10-CM

## 2023-05-19 DIAGNOSIS — Z13.6 SCREENING FOR CARDIOVASCULAR CONDITION: ICD-10-CM

## 2023-05-19 DIAGNOSIS — M79.671 RIGHT FOOT PAIN: ICD-10-CM

## 2023-05-19 DIAGNOSIS — F41.0 GENERALIZED ANXIETY DISORDER WITH PANIC ATTACKS: ICD-10-CM

## 2023-05-19 DIAGNOSIS — E78.2 MIXED HYPERLIPIDEMIA: ICD-10-CM

## 2023-05-19 DIAGNOSIS — Z00.00 ANNUAL PHYSICAL EXAM: ICD-10-CM

## 2023-05-19 DIAGNOSIS — F41.1 GENERALIZED ANXIETY DISORDER WITH PANIC ATTACKS: ICD-10-CM

## 2023-05-19 DIAGNOSIS — M85.88 OSTEOPENIA OF LUMBAR SPINE: ICD-10-CM

## 2023-05-19 DIAGNOSIS — R42 VERTIGO: ICD-10-CM

## 2023-05-19 DIAGNOSIS — R73.9 HYPERGLYCEMIA: ICD-10-CM

## 2023-05-19 DIAGNOSIS — E55.9 VITAMIN D DEFICIENCY: ICD-10-CM

## 2023-05-19 DIAGNOSIS — F41.1 GENERALIZED ANXIETY DISORDER: ICD-10-CM

## 2023-05-19 DIAGNOSIS — Z13.0 SCREENING FOR DEFICIENCY ANEMIA: ICD-10-CM

## 2023-05-19 DIAGNOSIS — F41.9 ANXIETY: ICD-10-CM

## 2023-05-19 DIAGNOSIS — F32.5 MAJOR DEPRESSIVE DISORDER WITH SINGLE EPISODE, IN FULL REMISSION (HCC): ICD-10-CM

## 2023-05-19 DIAGNOSIS — L03.031 PARONYCHIA OF TOENAIL OF RIGHT FOOT: ICD-10-CM

## 2023-05-19 DIAGNOSIS — B35.1 ONYCHOMYCOSIS: ICD-10-CM

## 2023-05-19 DIAGNOSIS — J30.1 ALLERGIC RHINITIS DUE TO POLLEN, UNSPECIFIED SEASONALITY: Primary | ICD-10-CM

## 2023-05-19 RX ORDER — BUSPIRONE HYDROCHLORIDE 15 MG/1
15 TABLET ORAL 3 TIMES DAILY
Qty: 180 TABLET | Refills: 0 | Status: SHIPPED | OUTPATIENT
Start: 2023-05-19

## 2023-05-19 RX ORDER — LORAZEPAM 0.5 MG/1
0.5 TABLET ORAL DAILY PRN
Qty: 30 TABLET | Refills: 0 | Status: SHIPPED | OUTPATIENT
Start: 2023-05-19

## 2023-05-19 RX ORDER — TERBINAFINE HYDROCHLORIDE 250 MG/1
250 TABLET ORAL DAILY
Qty: 30 TABLET | Refills: 0 | Status: SHIPPED | OUTPATIENT
Start: 2023-05-19 | End: 2023-06-18

## 2023-05-19 NOTE — PROGRESS NOTES
[facility]    NAME: Vanessa Chester  AGE: 56 y o   SEX: female  : 1975     DATE: 2023     Assessment and Plan:     Problem List Items Addressed This Visit        Respiratory    Allergic rhinitis - Primary     Continue Claritin and Flonase symptoms controlled            Musculoskeletal and Integument    Osteopenia     Continue vitamin D with calcium and instructed about the diet and exercise            Other    Anxiety     Anxiety panic attack prescribed clonazepam as needed BuSpar increased to 50 mg twice a day         Relevant Medications    busPIRone (BUSPAR) 15 mg tablet    LORazepam (Ativan) 0 5 mg tablet    Major depressive disorder with single episode, in full remission (HCC)     In remission continue Cymbalta         Relevant Medications    busPIRone (BUSPAR) 15 mg tablet    LORazepam (Ativan) 0 5 mg tablet    Generalized anxiety disorder with panic attacks     Having anxiety off and on much controlled increase BuSpar 50 mg twice a day and to use lorazepam as needed         Relevant Medications    busPIRone (BUSPAR) 15 mg tablet    LORazepam (Ativan) 0 5 mg tablet    Vertigo     For now seems resolved no recurrence since the last visit        Other Visit Diagnoses     Annual physical exam        Generalized anxiety disorder        Relevant Medications    busPIRone (BUSPAR) 15 mg tablet    LORazepam (Ativan) 0 5 mg tablet    Screening for deficiency anemia        Relevant Orders    CBC and differential    Mixed hyperlipidemia        Relevant Orders    Lipid Panel with Direct LDL reflex    Hyperglycemia        Relevant Orders    Hemoglobin A1C    Albumin / creatinine urine ratio    Urinalysis with microscopic    Screening for cardiovascular condition        Relevant Orders    Comprehensive metabolic panel    Vitamin D deficiency        Relevant Orders    Vitamin D 25 hydroxy          Immunizations and preventive care screenings were discussed with patient today  Appropriate education was printed on patient's after visit summary  Counseling:  Alcohol/drug use: discussed moderation in alcohol intake, the recommendations for healthy alcohol use, and avoidance of illicit drug use  Dental Health: discussed importance of regular tooth brushing, flossing, and dental visits  Injury prevention: discussed safety/seat belts, safety helmets, smoke detectors, carbon dioxide detectors, and smoking near bedding or upholstery  Sexual health: discussed sexually transmitted diseases, partner selection, use of condoms, avoidance of unintended pregnancy, and contraceptive alternatives  Exercise: the importance of regular exercise/physical activity was discussed  Recommend exercise 3-5 times per week for at least 30 minutes  Return in about 3 months (around 8/19/2023)  Chief Complaint:     Chief Complaint   Patient presents with   • Annual Exam   Refill of the meds   History of Present Illness:   Patient's overall condition improved anxiety much improved still using lorazepam as needed for anxiety attack denies any chest pain no palpitations no difficulty breathing  Adult Annual Physical   Patient here for a comprehensive physical exam  The patient reports no problems  Diet and Physical Activity  Diet/Nutrition: well balanced diet  Exercise: walking  Depression Screening  PHQ-2/9 Depression Screening         General Health  Sleep: sleeps well  Hearing: normal - bilateral   Vision: no vision problems  Dental: regular dental visits  /GYN Health  Patient is: postmenopausal    Contraceptive method: Not needed  Review of Systems:     Review of Systems   Constitutional: Negative for activity change, appetite change, chills, diaphoresis, fatigue and unexpected weight change  HENT: Positive for rhinorrhea   Negative for congestion, dental problem, drooling, ear discharge, ear pain, facial swelling, hearing loss, mouth sores, nosebleeds, postnasal drip, sinus pressure, sneezing, sore throat, tinnitus, trouble swallowing and voice change  Eyes: Negative for photophobia, pain, discharge, redness, itching and visual disturbance  Respiratory: Negative for apnea, choking, chest tightness, shortness of breath, wheezing and stridor  Cardiovascular: Negative for chest pain, palpitations and leg swelling  Gastrointestinal: Negative for abdominal distention, abdominal pain, anal bleeding, blood in stool, constipation, diarrhea, nausea, rectal pain and vomiting  Endocrine: Negative for cold intolerance, heat intolerance, polydipsia, polyphagia and polyuria  Genitourinary: Negative for decreased urine volume, difficulty urinating, dysuria, enuresis, flank pain, frequency, genital sores, hematuria and urgency  Musculoskeletal: Negative for arthralgias, back pain, gait problem, joint swelling, myalgias, neck pain and neck stiffness  Skin: Negative for color change, pallor, rash and wound  Allergic/Immunologic: Negative  Negative for environmental allergies, food allergies and immunocompromised state  Neurological: Negative for dizziness, tremors, seizures, syncope, facial asymmetry, speech difficulty, weakness, light-headedness, numbness and headaches  Psychiatric/Behavioral: Negative for agitation, behavioral problems, confusion, decreased concentration, dysphoric mood, hallucinations, self-injury, sleep disturbance and suicidal ideas  The patient is nervous/anxious  The patient is not hyperactive         Past Medical History:     Past Medical History:   Diagnosis Date   • Abdominal pain    • Anxiety    • Dizziness    • Lightheadedness    • Palpitations    • SOB (shortness of breath)       Past Surgical History:     Past Surgical History:   Procedure Laterality Date   • DXA PROCEDURE (HISTORICAL)  06/16/2020      Social History:     Social History     Socioeconomic History   • Marital "status:      Spouse name: None   • Number of children: None   • Years of education: None   • Highest education level: None   Occupational History   • None   Tobacco Use   • Smoking status: Never   • Smokeless tobacco: Never   Vaping Use   • Vaping Use: Never used   Substance and Sexual Activity   • Alcohol use: No   • Drug use: No   • Sexual activity: None   Other Topics Concern   • None   Social History Narrative   • None     Social Determinants of Health     Financial Resource Strain: Not on file   Food Insecurity: Not on file   Transportation Needs: Not on file   Physical Activity: Not on file   Stress: Not on file   Social Connections: Not on file   Intimate Partner Violence: Not on file   Housing Stability: Not on file      Family History:     Family History   Problem Relation Age of Onset   • Diabetes Father    • No Known Problems Brother    • No Known Problems Brother    • No Known Problems Son    • No Known Problems Son    • No Known Problems Paternal Uncle       Current Medications:     Current Outpatient Medications   Medication Sig Dispense Refill   • busPIRone (BUSPAR) 15 mg tablet Take 1 tablet (15 mg total) by mouth 3 (three) times a day 180 tablet 0   • DULoxetine (CYMBALTA) 30 mg delayed release capsule TAKE 1 CAPSULE DAILY 30 capsule 5   • DULoxetine (CYMBALTA) 60 mg delayed release capsule TAKE 1 CAPSULE EVERY MORNING 30 capsule 1   • LORazepam (Ativan) 0 5 mg tablet Take 1 tablet (0 5 mg total) by mouth daily as needed for anxiety 30 tablet 0   • meclizine (ANTIVERT) 25 mg tablet Take 1 tablet (25 mg total) by mouth 3 (three) times a day as needed for dizziness for up to 5 days 15 tablet 0   • terbinafine (LamISIL) 250 mg tablet Take 1 tablet (250 mg total) by mouth daily 30 tablet 0     No current facility-administered medications for this visit        Allergies:     No Known Allergies   Physical Exam:     /70   Pulse 70   Temp 98 °F (36 7 °C)   Resp 16   Ht 5' 1\" (1 549 m)   Wt " 49 8 kg (109 lb 12 8 oz)   LMP 07/27/2021   SpO2 99%   BMI 20 75 kg/m²     Physical Exam  Vitals and nursing note reviewed  Constitutional:       General: She is not in acute distress  Appearance: She is well-developed  HENT:      Head: Normocephalic and atraumatic  Eyes:      Conjunctiva/sclera: Conjunctivae normal    Cardiovascular:      Rate and Rhythm: Normal rate and regular rhythm  Heart sounds: No murmur heard  Pulmonary:      Effort: Pulmonary effort is normal  No respiratory distress  Breath sounds: Normal breath sounds  Abdominal:      Palpations: Abdomen is soft  Tenderness: There is no abdominal tenderness  Musculoskeletal:         General: No swelling  Cervical back: Neck supple  Skin:     General: Skin is warm and dry  Capillary Refill: Capillary refill takes less than 2 seconds  Neurological:      Mental Status: She is alert     Psychiatric:         Mood and Affect: Mood normal           Arash Macdonald MD  [facility]

## 2023-05-19 NOTE — PROGRESS NOTES
Assessment/Plan: Pain upon ambulation   Paronychia right hallux   History of trauma   Onychomycosis right hallux   Contusion      Plan  Chart reviewed   Foot exam performed   Patient advised on condition   We will try both oral and topical antifungal   These have been prescribed   Patient will spray her shoes with Lysol   She will take biotin as directed   Return for follow-up            Diagnoses and all orders for this visit:     Right foot pain     Onychomycosis  -     terbinafine (LamISIL) 250 mg tablet;  Take 1 tablet (250 mg total) by mouth daily     Paronychia of toenail of right foot     Contusion of right great toe with damage to nail            Subjective: Patient presents for evaluation of a right big toe toenail problem   Patient has history of injury to the toe many months prior and since that time the nail has become disfigured   Can be painful with shoe wear      No Known Allergies        Current Outpatient Medications:   •  terbinafine (LamISIL) 250 mg tablet, Take 1 tablet (250 mg total) by mouth daily, Disp: 30 tablet, Rfl: 0  •  DULoxetine (CYMBALTA) 30 mg delayed release capsule, TAKE ONE CAPSULE DAILY IN THE EVENING, Disp: 30 capsule, Rfl: 5  •  DULoxetine (CYMBALTA) 60 mg delayed release capsule, TAKE 1 CAPSULE DAILY IN THE MORNING , Disp: 30 capsule, Rfl: 5  •  fluticasone (FLONASE) 50 mcg/act nasal spray, 1 spray into each nostril daily (Patient not taking: Reported on 9/27/2021), Disp: 16 g, Rfl: 0  •  meclizine (ANTIVERT) 25 mg tablet, Take 1 tablet (25 mg total) by mouth 3 (three) times a day as needed for dizziness for up to 5 days, Disp: 15 tablet, Rfl: 0  •  Norethin-Eth Estrad-Fe Biphas (LO LOESTRIN FE) 1 MG-10 MCG / 10 MCG TABS, Take 1 tablet by mouth daily (Patient not taking: Reported on 9/27/2021), Disp: , Rfl:      There is no problem list on file for this patient             Patient ID: Crystal Dickinson is a 47 y o  female      HPI     The following portions of the patient's history were reviewed and updated as appropriate:      family history is not on file        reports that she has never smoked  She has never used smokeless tobacco  She reports that she does not drink alcohol and does not use drugs      Objective:  Patient's shoes and socks removed    Foot ExamPhysical Exam  Vitals and nursing note reviewed  Constitutional:       Appearance: Normal appearance  Cardiovascular:      Rate and Rhythm: Normal rate and regular rhythm  Skin:     Capillary Refill: Capillary refill takes less than 2 seconds       Comments: Right hallux demonstrates dystrophy of nail   Nail is mostly lysed off of the nailbed   There is mycosis noted within the area   Mild paronychia   Negative pus  Line of demarcation noted  New nail is growing in fungus clear  Neurological:      Mental Status: She is alert  Psychiatric:         Mood and Affect: Mood normal          BehaviorCaroline Wong         Thought Content:  Thought content normal          Judgment: Judgment normal

## 2023-05-19 NOTE — ASSESSMENT & PLAN NOTE
Having anxiety off and on much controlled increase BuSpar 50 mg twice a day and to use lorazepam as needed

## 2023-05-20 LAB
25(OH)D3+25(OH)D2 SERPL-MCNC: 20.2 NG/ML (ref 30–100)
ALBUMIN SERPL-MCNC: 4.4 G/DL (ref 3.8–4.8)
ALBUMIN/CREAT UR: ABNORMAL MG/G CREAT (ref 0–29)
ALBUMIN/GLOB SERPL: 1.8 {RATIO} (ref 1.2–2.2)
ALP SERPL-CCNC: 52 IU/L (ref 44–121)
ALT SERPL-CCNC: 25 IU/L (ref 0–32)
APPEARANCE UR: CLEAR
AST SERPL-CCNC: 23 IU/L (ref 0–40)
BACTERIA URNS QL MICRO: NORMAL
BASOPHILS # BLD AUTO: 0.1 X10E3/UL (ref 0–0.2)
BASOPHILS NFR BLD AUTO: 2 %
BILIRUB SERPL-MCNC: <0.2 MG/DL (ref 0–1.2)
BILIRUB UR QL STRIP: NEGATIVE
BUN SERPL-MCNC: 21 MG/DL (ref 6–24)
BUN/CREAT SERPL: 35 (ref 9–23)
CALCIUM SERPL-MCNC: 9.1 MG/DL (ref 8.7–10.2)
CASTS URNS QL MICRO: NORMAL /LPF
CHLORIDE SERPL-SCNC: 104 MMOL/L (ref 96–106)
CHOLEST SERPL-MCNC: 209 MG/DL (ref 100–199)
CO2 SERPL-SCNC: 25 MMOL/L (ref 20–29)
COLOR UR: YELLOW
CREAT SERPL-MCNC: 0.6 MG/DL (ref 0.57–1)
CREAT UR-MCNC: 4.8 MG/DL
EGFR: 112 ML/MIN/1.73
EOSINOPHIL # BLD AUTO: 0 X10E3/UL (ref 0–0.4)
EOSINOPHIL NFR BLD AUTO: 1 %
EPI CELLS #/AREA URNS HPF: NORMAL /HPF (ref 0–10)
ERYTHROCYTE [DISTWIDTH] IN BLOOD BY AUTOMATED COUNT: 11.5 % (ref 11.7–15.4)
GLOBULIN SER-MCNC: 2.5 G/DL (ref 1.5–4.5)
GLUCOSE SERPL-MCNC: 51 MG/DL (ref 70–99)
GLUCOSE UR QL: NEGATIVE
HBA1C MFR BLD: 5 % (ref 4.8–5.6)
HCT VFR BLD AUTO: 37.6 % (ref 34–46.6)
HDLC SERPL-MCNC: 66 MG/DL
HGB BLD-MCNC: 13.1 G/DL (ref 11.1–15.9)
HGB UR QL STRIP: NEGATIVE
IMM GRANULOCYTES # BLD: 0 X10E3/UL (ref 0–0.1)
IMM GRANULOCYTES NFR BLD: 0 %
KETONES UR QL STRIP: NEGATIVE
LDLC SERPL CALC-MCNC: 125 MG/DL (ref 0–99)
LEUKOCYTE ESTERASE UR QL STRIP: ABNORMAL
LYMPHOCYTES # BLD AUTO: 1.7 X10E3/UL (ref 0.7–3.1)
LYMPHOCYTES NFR BLD AUTO: 32 %
MCH RBC QN AUTO: 32.2 PG (ref 26.6–33)
MCHC RBC AUTO-ENTMCNC: 34.8 G/DL (ref 31.5–35.7)
MCV RBC AUTO: 92 FL (ref 79–97)
MICRO URNS: ABNORMAL
MICROALBUMIN UR-MCNC: <3 UG/ML
MONOCYTES # BLD AUTO: 0.6 X10E3/UL (ref 0.1–0.9)
MONOCYTES NFR BLD AUTO: 10 %
NEUTROPHILS # BLD AUTO: 3.1 X10E3/UL (ref 1.4–7)
NEUTROPHILS NFR BLD AUTO: 55 %
NITRITE UR QL STRIP: NEGATIVE
PH UR STRIP: 7 [PH] (ref 5–7.5)
PLATELET # BLD AUTO: 257 X10E3/UL (ref 150–450)
POTASSIUM SERPL-SCNC: 4.1 MMOL/L (ref 3.5–5.2)
PROT SERPL-MCNC: 6.9 G/DL (ref 6–8.5)
PROT UR QL STRIP: NEGATIVE
RBC # BLD AUTO: 4.07 X10E6/UL (ref 3.77–5.28)
RBC #/AREA URNS HPF: NORMAL /HPF (ref 0–2)
SODIUM SERPL-SCNC: 139 MMOL/L (ref 134–144)
SP GR UR: 1.01 (ref 1–1.03)
TRIGL SERPL-MCNC: 104 MG/DL (ref 0–149)
UROBILINOGEN UR STRIP-ACNC: 0.2 MG/DL (ref 0.2–1)
WBC # BLD AUTO: 5.5 X10E3/UL (ref 3.4–10.8)
WBC #/AREA URNS HPF: NORMAL /HPF (ref 0–5)

## 2023-05-22 ENCOUNTER — TELEPHONE (OUTPATIENT)
Dept: INTERNAL MEDICINE CLINIC | Facility: CLINIC | Age: 48
End: 2023-05-22

## 2023-05-22 NOTE — TELEPHONE ENCOUNTER
Patient called regarding lab results, she has questions about the results she is seeing in her chart.   Please review and call patient

## 2023-05-23 DIAGNOSIS — E55.9 VITAMIN D DEFICIENCY: Primary | ICD-10-CM

## 2023-05-23 RX ORDER — ERGOCALCIFEROL 1.25 MG/1
50000 CAPSULE ORAL WEEKLY
Qty: 12 CAPSULE | Refills: 0 | Status: SHIPPED | OUTPATIENT
Start: 2023-05-23

## 2023-06-05 DIAGNOSIS — F32.4 MAJOR DEPRESSIVE DISORDER WITH SINGLE EPISODE, IN PARTIAL REMISSION (HCC): ICD-10-CM

## 2023-06-05 RX ORDER — DULOXETIN HYDROCHLORIDE 60 MG/1
CAPSULE, DELAYED RELEASE ORAL
Qty: 30 CAPSULE | Refills: 1 | Status: SHIPPED | OUTPATIENT
Start: 2023-06-05

## 2023-08-06 DIAGNOSIS — F32.4 MAJOR DEPRESSIVE DISORDER WITH SINGLE EPISODE, IN PARTIAL REMISSION (HCC): ICD-10-CM

## 2023-08-07 RX ORDER — DULOXETIN HYDROCHLORIDE 60 MG/1
CAPSULE, DELAYED RELEASE ORAL
Qty: 30 CAPSULE | Refills: 1 | Status: SHIPPED | OUTPATIENT
Start: 2023-08-07

## 2023-08-28 DIAGNOSIS — F41.1 GENERALIZED ANXIETY DISORDER: ICD-10-CM

## 2023-08-28 DIAGNOSIS — F41.9 ANXIETY: ICD-10-CM

## 2023-08-28 RX ORDER — BUSPIRONE HYDROCHLORIDE 15 MG/1
15 TABLET ORAL 3 TIMES DAILY
Qty: 180 TABLET | Refills: 0 | Status: SHIPPED | OUTPATIENT
Start: 2023-08-28

## 2023-08-28 RX ORDER — LORAZEPAM 0.5 MG/1
0.5 TABLET ORAL DAILY PRN
Qty: 30 TABLET | Refills: 0 | Status: SHIPPED | OUTPATIENT
Start: 2023-08-28

## 2023-09-25 DIAGNOSIS — F32.4 MAJOR DEPRESSIVE DISORDER WITH SINGLE EPISODE, IN PARTIAL REMISSION (HCC): ICD-10-CM

## 2023-09-25 RX ORDER — DULOXETIN HYDROCHLORIDE 30 MG/1
CAPSULE, DELAYED RELEASE ORAL
Qty: 30 CAPSULE | Refills: 5 | Status: SHIPPED | OUTPATIENT
Start: 2023-09-25

## 2023-10-02 ENCOUNTER — TELEPHONE (OUTPATIENT)
Dept: INTERNAL MEDICINE CLINIC | Facility: CLINIC | Age: 48
End: 2023-10-02

## 2023-10-02 DIAGNOSIS — F41.1 GENERALIZED ANXIETY DISORDER: ICD-10-CM

## 2023-10-02 DIAGNOSIS — F41.9 ANXIETY: ICD-10-CM

## 2023-10-02 RX ORDER — LORAZEPAM 0.5 MG/1
0.5 TABLET ORAL DAILY PRN
Qty: 30 TABLET | Refills: 0 | Status: SHIPPED | OUTPATIENT
Start: 2023-10-02 | End: 2023-10-05 | Stop reason: SDUPTHER

## 2023-10-02 NOTE — TELEPHONE ENCOUNTER
Reason for call:   [x] Refill   [] Prior Auth  [] Other:     Office:   [x] PCP/Provider -   [] Speciality/Provider -     Medication: ativan    Dose/Frequency: 0.5 mg/ daily PRN    Quantity: 30 day supply    Pharmacy: [id number] [address]     Does the patient have enough for 3 days?    [x] Yes   [] No - Send as HP to POD

## 2023-10-05 ENCOUNTER — OFFICE VISIT (OUTPATIENT)
Dept: INTERNAL MEDICINE CLINIC | Facility: CLINIC | Age: 48
End: 2023-10-05
Payer: COMMERCIAL

## 2023-10-05 VITALS
TEMPERATURE: 98 F | OXYGEN SATURATION: 100 % | HEIGHT: 61 IN | HEART RATE: 80 BPM | DIASTOLIC BLOOD PRESSURE: 70 MMHG | SYSTOLIC BLOOD PRESSURE: 98 MMHG | BODY MASS INDEX: 21.37 KG/M2 | WEIGHT: 113.2 LBS

## 2023-10-05 DIAGNOSIS — F41.1 GENERALIZED ANXIETY DISORDER WITH PANIC ATTACKS: ICD-10-CM

## 2023-10-05 DIAGNOSIS — F41.1 GENERALIZED ANXIETY DISORDER: ICD-10-CM

## 2023-10-05 DIAGNOSIS — F41.0 GENERALIZED ANXIETY DISORDER WITH PANIC ATTACKS: ICD-10-CM

## 2023-10-05 DIAGNOSIS — R42 VERTIGO: ICD-10-CM

## 2023-10-05 DIAGNOSIS — F41.9 ANXIETY: ICD-10-CM

## 2023-10-05 DIAGNOSIS — M85.88 OSTEOPENIA OF LUMBAR SPINE: ICD-10-CM

## 2023-10-05 DIAGNOSIS — F32.5 MAJOR DEPRESSIVE DISORDER WITH SINGLE EPISODE, IN FULL REMISSION (HCC): Primary | ICD-10-CM

## 2023-10-05 DIAGNOSIS — J30.1 ALLERGIC RHINITIS DUE TO POLLEN, UNSPECIFIED SEASONALITY: ICD-10-CM

## 2023-10-05 PROCEDURE — 99213 OFFICE O/P EST LOW 20 MIN: CPT | Performed by: INTERNAL MEDICINE

## 2023-10-05 RX ORDER — BUSPIRONE HYDROCHLORIDE 15 MG/1
15 TABLET ORAL 3 TIMES DAILY
Qty: 180 TABLET | Refills: 0 | Status: SHIPPED | OUTPATIENT
Start: 2023-10-05

## 2023-10-05 RX ORDER — LORAZEPAM 0.5 MG/1
0.5 TABLET ORAL DAILY PRN
Qty: 30 TABLET | Refills: 2 | Status: SHIPPED | OUTPATIENT
Start: 2023-10-05

## 2023-10-05 NOTE — PROGRESS NOTES
Depression Screening and Follow-up Plan: Patient advised to follow-up with PCP for further management. In remission now continue Cymbalta    Dr. Norris Ayers Office Visit Note  10/05/23     Jimmy Andino 53 y.o. female MRN: [id number]  : 1975    Assessment:     1. Major depressive disorder with single episode, in full remission [facility])  Assessment & Plan:  Much improved in remission continue Cymbalta      2. Anxiety  -     LORazepam (Ativan) 0.5 mg tablet; Take 1 tablet (0.5 mg total) by mouth daily as needed for anxiety  -     busPIRone (BUSPAR) 15 mg tablet; Take 1 tablet (15 mg total) by mouth 3 (three) times a day    3. Generalized anxiety disorder  -     LORazepam (Ativan) 0.5 mg tablet; Take 1 tablet (0.5 mg total) by mouth daily as needed for anxiety  -     busPIRone (BUSPAR) 15 mg tablet; Take 1 tablet (15 mg total) by mouth 3 (three) times a day    4. Allergic rhinitis due to pollen, unspecified seasonality  Assessment & Plan:  Symptoms controlled continue Claritin      5. Osteopenia of lumbar spine  Assessment & Plan:  Continue vitamin D with calcium      6. Vertigo  Assessment & Plan:  Continue exercise at home and Antivert 12.5 mg 3 times a day as needed symptoms seems to be controlled with this regimen      7. Generalized anxiety disorder with panic attacks  Assessment & Plan: BuSpar increased to 15 mg 3 times a day and continue lorazepam 0.5 mg once a day as needed for panic attack            Discussion Summary and Plan: Today's care plan and medications were reviewed with patient in detail and all their questions answered to their satisfaction.     Chief Complaint   Patient presents with   • Follow-up      Subjective:  Patient came in follow-up chronic medical condition listed under visit diagnosis on BuSpar 50 mg twice a day much improved depression in remission with Cymbalta the vertigo symptoms controlled with Antivert as needed overall controlled      The following portions of the patient's history were reviewed and updated as appropriate: allergies, current medications, past family history, past medical history, past social history, past surgical history and problem list.    Review of Systems   Constitutional: Negative for activity change, appetite change, chills, diaphoresis, fatigue, fever and unexpected weight change. HENT: Negative for congestion, dental problem, drooling, ear discharge, ear pain, facial swelling, hearing loss, mouth sores, nosebleeds, postnasal drip, rhinorrhea, sinus pressure, sneezing, sore throat, tinnitus, trouble swallowing and voice change. Eyes: Negative for photophobia, pain, discharge, redness, itching and visual disturbance. Respiratory: Negative for apnea, cough, choking, chest tightness, shortness of breath, wheezing and stridor. Cardiovascular: Negative for chest pain, palpitations and leg swelling. Gastrointestinal: Negative for abdominal distention, abdominal pain, anal bleeding, blood in stool, constipation, diarrhea, nausea, rectal pain and vomiting. Endocrine: Negative for cold intolerance, heat intolerance, polydipsia, polyphagia and polyuria. Genitourinary: Negative for decreased urine volume, difficulty urinating, dysuria, enuresis, flank pain, frequency, genital sores, hematuria and urgency. Musculoskeletal: Negative for arthralgias, back pain, gait problem, joint swelling, myalgias, neck pain and neck stiffness. Skin: Negative for color change, pallor, rash and wound. Allergic/Immunologic: Negative. Negative for environmental allergies, food allergies and immunocompromised state. Neurological: Negative for dizziness, tremors, seizures, syncope, facial asymmetry, speech difficulty, weakness, light-headedness, numbness and headaches. Psychiatric/Behavioral: Negative for agitation, behavioral problems, confusion, decreased concentration, dysphoric mood, hallucinations, self-injury, sleep disturbance and suicidal ideas.  The patient is nervous/anxious. The patient is not hyperactive.           Historical Information   Patient Active Problem List   Diagnosis   • Anxiety   • Fall   • Major depressive disorder with single episode, in full remission ([address])   • Osteopenia   • Allergic rhinitis   • Generalized anxiety disorder with panic attacks   • Vertigo     Past Medical History:   Diagnosis Date   • Abdominal pain    • Anxiety    • Dizziness    • Lightheadedness    • Palpitations    • SOB (shortness of breath)      Past Surgical History:   Procedure Laterality Date   • DXA PROCEDURE (HISTORICAL)  06/16/2020     Social History     Substance and Sexual Activity   Alcohol Use No     Social History     Substance and Sexual Activity   Drug Use No     Social History     Tobacco Use   Smoking Status Never   Smokeless Tobacco Never     Family History   Problem Relation Age of Onset   • Diabetes Father    • No Known Problems Brother    • No Known Problems Brother    • No Known Problems Son    • No Known Problems Son    • No Known Problems Paternal Uncle      Health Maintenance Due   Topic   • Hepatitis C Screening    • COVID-19 Vaccine (1)   • Cervical Cancer Screening    • Colorectal Cancer Screening    • Influenza Vaccine (1)      Meds/Allergies       Current Outpatient Medications:   •  busPIRone (BUSPAR) 15 mg tablet, Take 1 tablet (15 mg total) by mouth 3 (three) times a day, Disp: 180 tablet, Rfl: 0  •  DULoxetine (CYMBALTA) 30 mg delayed release capsule, TAKE 1 CAPSULE DAILY, Disp: 30 capsule, Rfl: 5  •  DULoxetine (CYMBALTA) 60 mg delayed release capsule, TAKE 1 CAPSULE EVERY MORNING, Disp: 30 capsule, Rfl: 1  •  LORazepam (Ativan) 0.5 mg tablet, Take 1 tablet (0.5 mg total) by mouth daily as needed for anxiety, Disp: 30 tablet, Rfl: 2  •  busPIRone (BUSPAR) 15 mg tablet, Take 1 tablet (15 mg total) by mouth 3 (three) times a day, Disp: 180 tablet, Rfl: 0  •  ergocalciferol (VITAMIN D2) 50,000 units, Take 1 capsule (50,000 Units total) by mouth once a week (Patient not taking: Reported on 10/5/2023), Disp: 12 capsule, Rfl: 0  •  LORazepam (Ativan) 0.5 mg tablet, Take 1 tablet (0.5 mg total) by mouth daily as needed for anxiety, Disp: 30 tablet, Rfl: 2  •  meclizine (ANTIVERT) 25 mg tablet, Take 1 tablet (25 mg total) by mouth 3 (three) times a day as needed for dizziness for up to 5 days, Disp: 15 tablet, Rfl: 0      Objective:    Vitals:   BP 98/70   Pulse 80   Temp 98 °F (36.7 °C)   Ht 5' 1" (1.549 m)   Wt 51.3 kg (113 lb 3.2 oz)   LMP 07/27/2021   SpO2 100%   BMI 21.39 kg/m²   Body mass index is 21.39 kg/m². Vitals:    10/05/23 0815   Weight: 51.3 kg (113 lb 3.2 oz)       Physical Exam  Vitals and nursing note reviewed. Constitutional:       General: She is not in acute distress. Appearance: She is well-developed. She is not ill-appearing, toxic-appearing or diaphoretic. HENT:      Head: Normocephalic and atraumatic. Right Ear: External ear normal.      Left Ear: External ear normal.      Nose: Congestion present. Mouth/Throat:      Pharynx: No oropharyngeal exudate. Eyes:      General: Lids are normal. Lids are everted, no foreign bodies appreciated. No scleral icterus. Right eye: No discharge. Left eye: No discharge. Conjunctiva/sclera: Conjunctivae normal.      Pupils: Pupils are equal, round, and reactive to light. Neck:      Thyroid: No thyromegaly. Vascular: Normal carotid pulses. No carotid bruit, hepatojugular reflux or JVD. Trachea: No tracheal tenderness or tracheal deviation. Cardiovascular:      Rate and Rhythm: Normal rate and regular rhythm. Pulses: Normal pulses. Heart sounds: Normal heart sounds. No murmur heard. No friction rub. No gallop. Pulmonary:      Effort: Pulmonary effort is normal. No respiratory distress. Breath sounds: Normal breath sounds. No stridor. No wheezing or rales. Chest:      Chest wall: No tenderness.    Abdominal:      General: Bowel sounds are normal. There is no distension. Palpations: Abdomen is soft. There is no mass. Tenderness: There is no abdominal tenderness. There is no guarding or rebound. Musculoskeletal:         General: No tenderness or deformity. Normal range of motion. Cervical back: Normal range of motion and neck supple. No edema, erythema or rigidity. No spinous process tenderness or muscular tenderness. Normal range of motion. Lymphadenopathy:      Head:      Right side of head: No submental, submandibular, tonsillar, preauricular or posterior auricular adenopathy. Left side of head: No submental, submandibular, tonsillar, preauricular, posterior auricular or occipital adenopathy. Cervical: No cervical adenopathy. Right cervical: No superficial, deep or posterior cervical adenopathy. Left cervical: No superficial, deep or posterior cervical adenopathy. Upper Body:      Right upper body: No pectoral adenopathy. Left upper body: No pectoral adenopathy. Skin:     General: Skin is warm and dry. Coloration: Skin is not pale. Findings: No erythema or rash. Neurological:      General: No focal deficit present. Mental Status: She is alert and oriented to person, place, and time. Cranial Nerves: No cranial nerve deficit. Sensory: No sensory deficit. Motor: No tremor, abnormal muscle tone or seizure activity. Coordination: Coordination normal.      Gait: Gait normal.      Deep Tendon Reflexes: Reflexes are normal and symmetric. Reflexes normal.   Psychiatric:         Behavior: Behavior normal.         Thought Content: Thought content normal.         Judgment: Judgment normal.         Lab Review   No visits with results within 2 Month(s) from this visit.    Latest known visit with results is:   Orders Only on 05/19/2023   Component Date Value Ref Range Status   • White Blood Cell Count 05/19/2023 5.5  3.4 - 10.8 x10E3/uL Final   • Red Blood Cell Count 05/19/2023 4.07  3.77 - 5.28 x10E6/uL Final   • Hemoglobin 05/19/2023 13.1  11.1 - 15.9 g/dL Final   • HCT 05/19/2023 37.6  34.0 - 46.6 % Final   • MCV 05/19/2023 92  79 - 97 fL Final   • MCH 05/19/2023 32.2  26.6 - 33.0 pg Final   • MCHC 05/19/2023 34.8  31.5 - 35.7 g/dL Final   • RDW 05/19/2023 11.5 (L)  11.7 - 15.4 % Final   • Platelet Count 86/03/4553 257  150 - 450 x10E3/uL Final   • Neutrophils 05/19/2023 55  Not Estab. % Final   • Lymphocytes 05/19/2023 32  Not Estab. % Final   • Monocytes 05/19/2023 10  Not Estab. % Final   • Eosinophils 05/19/2023 1  Not Estab. % Final   • Basophils PCT 05/19/2023 2  Not Estab. % Final   • Neutrophils (Absolute) 05/19/2023 3.1  1.4 - 7.0 x10E3/uL Final   • Lymphocytes (Absolute) 05/19/2023 1.7  0.7 - 3.1 x10E3/uL Final   • Monocytes (Absolute) 05/19/2023 0.6  0.1 - 0.9 x10E3/uL Final   • Eosinophils (Absolute) 05/19/2023 0.0  0.0 - 0.4 x10E3/uL Final   • Basophils ABS 05/19/2023 0.1  0.0 - 0.2 x10E3/uL Final   • Immature Granulocytes 05/19/2023 0  Not Estab. % Final   • Immature Granulocytes (Absolute) 05/19/2023 0.0  0.0 - 0.1 x10E3/uL Final    Comment: A hand-written panel/profile was received from your office. In  accordance with the [facility] Ambiguous Test Code Policy dated July 3139, we have assigned CBC with Differential/Platelet, Test Code  #[id number] to this request. If this is not the testing you wished to  receive on this specimen, please contact the [facility] Department to clarify the test order. We  appreciate your business.      • Glucose, Random 05/19/2023 51 (L)  70 - 99 mg/dL Final   • BUN 05/19/2023 21  6 - 24 mg/dL Final   • Creatinine 05/19/2023 0.60  0.57 - 1.00 mg/dL Final   • eGFR 05/19/2023 112  >59 mL/min/1.73 Final   • SL AMB BUN/CREATININE RATIO 05/19/2023 35 (H)  9 - 23 Final   • Sodium 05/19/2023 139  134 - 144 mmol/L Final   • Potassium 05/19/2023 4.1  3.5 - 5.2 mmol/L Final   • Chloride 05/19/2023 104  96 - 106 mmol/L Final   • CO2 05/19/2023 25  20 - 29 mmol/L Final   • CALCIUM 05/19/2023 9.1  8.7 - 10.2 mg/dL Final   • Protein, Total 05/19/2023 6.9  6.0 - 8.5 g/dL Final   • Albumin 05/19/2023 4.4  3.8 - 4.8 g/dL Final   • Globulin, Total 05/19/2023 2.5  1.5 - 4.5 g/dL Final   • Albumin/Globulin Ratio 05/19/2023 1.8  1.2 - 2.2 Final   • TOTAL BILIRUBIN 05/19/2023 <0.2  0.0 - 1.2 mg/dL Final   • Alk Phos Isoenzymes 05/19/2023 52  44 - 121 IU/L Final   • AST 05/19/2023 23  0 - 40 IU/L Final   • ALT 05/19/2023 25  0 - 32 IU/L Final   • Specific Gravity 05/19/2023 1.007  1.005 - 1.030 Final   • Ph 05/19/2023 7.0  5.0 - 7.5 Final   • Color UA 05/19/2023 Yellow  Yellow Final   • Urine Appearance 05/19/2023 Clear  Clear Final   • Leukocyte Esterase 05/19/2023 Trace (A)  Negative Final   • Protein 05/19/2023 Negative  Negative/Trace Final   • Glucose, 24 HR Urine 05/19/2023 Negative  Negative Final   • Ketone, Urine 05/19/2023 Negative  Negative Final   • Blood, Urine 05/19/2023 Negative  Negative Final   • Bilirubin, Urine 05/19/2023 Negative  Negative Final   • Urobilinogen Urine 05/19/2023 0.2  0.2 - 1.0 mg/dL Final   • Nitrites Urine 05/19/2023 Negative  Negative Final   • Microscopic Examination 05/19/2023 See below:   Final    Microscopic was indicated and was performed.    • SL AMB WBC, URINE 05/19/2023 0-5  0 - 5 /hpf Final   • RBC, Urine 05/19/2023 None seen  0 - 2 /hpf Final   • Epithelial Cells (non renal) 05/19/2023 None seen  0 - 10 /hpf Final   • Casts 05/19/2023 None seen  None seen /lpf Final   • Bacteria, Urine 05/19/2023 None seen  None seen/Few Final   • Cholesterol, Total 05/19/2023 209 (H)  100 - 199 mg/dL Final   • Triglycerides 05/19/2023 104  0 - 149 mg/dL Final   • HDL 05/19/2023 66  >39 mg/dL Final   • LDL Calculated 05/19/2023 125 (H)  0 - 99 mg/dL Final   • Creatinine, Urine 05/19/2023 4.8  Not Estab. mg/dL Final   • Albumin,U,Random 05/19/2023 <3.0  Not Estab. ug/mL Final   • Microalb/Creat Ratio 05/19/2023 Comment (A)  0 - 29 mg/g creat Final    Comment: The result is below the assay's limit of quantitation which may  indicate a dilute specimen or other clinical condition. Consider  recollection at a time likely to provide a urine that is more  concentrated. Normal:                0 -  29                         Moderately increased: 30 - 300                         Severely increased:       >300     • Hemoglobin A1C 05/19/2023 5.0  4.8 - 5.6 % Final    Comment:          Prediabetes: 5.7 - 6.4           Diabetes: >6.4           Glycemic control for adults with diabetes: <7.0     • 25-HYDROXY VIT D 05/19/2023 20.2 (L)  30.0 - 100.0 ng/mL Final    Comment: Vitamin D deficiency has been defined by the [place] of  Medicine and an Endocrine Society practice guideline as a  level of serum 25-OH vitamin D less than 20 ng/mL (1,2). The Endocrine Society went on to further define vitamin D  insufficiency as a level between 21 and 29 ng/mL (2). 1. IOM ([place] of Medicine). 2010. Dietary reference     intakes for calcium and D. [employer]: The     [employer]. 2. Alison MF, [place] ROJAS, Marianna RUVALCABA, et al.     Evaluation, treatment, and prevention of vitamin D     deficiency: an Endocrine Society clinical practice     guideline. JCEM. 2011 Jul; 96(7):1911-30. Patient Instructions   Anxiety   WHAT YOU NEED TO KNOW:   Anxiety is a condition that causes you to feel extremely worried or nervous. The feelings are so strong that they can cause problems with your daily activities or sleep. Anxiety may be triggered by something you fear, or it may happen without a cause. Family or work stress, smoking, caffeine, and alcohol can increase your risk for anxiety. Certain medicines or health conditions can also increase your risk. Anxiety can become a long-term condition if it is not managed or treated.   DISCHARGE INSTRUCTIONS:   Call your local emergency number [phone] in the [address]) if:   You have chest pain, tightness, or heaviness that may spread to your shoulders, arms, jaw, neck, or back. You think about harming yourself or someone else. Call your doctor if:   Your symptoms get worse or do not get better with treatment. Your anxiety keeps you from doing your regular daily activities. You have new symptoms since your last visit. You have questions or concerns about your condition or care. The following resources are available at any time to help you, if needed:   Contact a suicide prevention organization: For the [employer] Suicide and Crisis Lifeline:     Call or text [phone] [phone] a chat on https://Marquee/chat     Call 2-481.698.2299 (6-429-984-TALK)    For the Suicide Hotline, call 7-387.251.6480 (7-516-CLNCBEI)    For a list of international numbers: https://save.org/find-help/international-resources/  Medicines: You may need any of the following:  Anxiety or antidepressant medicine  may help relieve or prevent anxiety. You may need to take the medicine for several weeks before you begin to feel better. Tell your healthcare provider about any side effects or problems you have with your medicine. The type or amount of medicine may need to be changed. Take your medicine as directed. Contact your healthcare provider if you think your medicine is not helping or if you have side effects. Tell your provider if you are allergic to any medicine. Keep a list of the medicines, vitamins, and herbs you take. Include the amounts, and when and why you take them. Bring the list or the pill bottles to follow-up visits. Carry your medicine list with you in case of an emergency. Cognitive behavioral therapy (CBT)  teaches you how to identify and change negative thought patterns. Manage anxiety:   Talk to someone about your anxiety. Your healthcare provider may suggest counseling.  You might feel more comfortable talking with a friend or family member about your anxiety. Choose someone you know will be supportive and encouraging. Get regular physical activity. Physical activity can lower your stress, improve your mood, and help you sleep better. Work with your healthcare provider to develop a plan that you enjoy. Create a regular sleep schedule. A routine can help you relax before bed. Listen to music, read, or do yoga. Try to go to bed and wake up at the same time every day. Sleep is important for emotional health. Do activities you enjoy. Spend time with friends, or do something fun. Choose activities you are familiar with or comfortable doing. This may help prevent anxiety. Practice deep breathing. Deep breathing can help you relax when you feel anxious. Focus on taking slow, deep breaths several times a day, or during an anxiety attack. Breathe in through your nose and out through your mouth. Deep breathing combined with meditation or listening to music may help you feel calmer. Do not smoke. Nicotine and other chemicals in cigarettes and cigars can increase anxiety. Ask your healthcare provider for information if you currently smoke and need help to quit. E-cigarettes or smokeless tobacco still contain nicotine. Talk to your healthcare provider before you use these products. Do not have caffeine. Caffeine can make your symptoms worse. Do not have foods or drinks that are meant to increase your energy level. Do not drink alcohol or use drugs. Alcohol and drugs can worsen anxiety or make it hard to manage. Talk to your therapist or healthcare provider if you need help to quit. Follow up with your therapist or doctor as directed: Your healthcare provider will monitor your progress at follow-up visits. Your provider will also monitor your medicine if you take antidepressants and ask if the medicine is helping. Tell your provider about any side effects or problems you have with your medicine.  The type or amount of medicine may need to be changed. Write down your questions so you remember to ask them during your visits. For more information or support:   [employer] [facility]  [address] [place] [facility] , [address]  Phone: [phone]  Phone: [phone]  Web Address: http://[employer]. org  [address] [place] [address] Suicide and [address] [place] [phone]  Phone: 5- [phone]  Web Address: [employer] [place] https://[employer]/    © Copyright [employer] 2023 Information is for End User's use only and may not be sold, redistributed or otherwise used for commercial purposes. The above information is an  only. It is not intended as medical advice for individual conditions or treatments. Talk to your doctor, nurse or pharmacist before following any medical regimen to see if it is safe and effective for you. Fabiana Tamez MD        "This note has been constructed using a voice recognition system. Therefore there may be syntax, spelling, and/or grammatical errors.  Please call if you have any questions. "

## 2023-10-05 NOTE — ASSESSMENT & PLAN NOTE
BuSpar increased to 15 mg 3 times a day and continue lorazepam 0.5 mg once a day as needed for panic attack

## 2023-10-05 NOTE — PATIENT INSTRUCTIONS
Anxiety   WHAT YOU NEED TO KNOW:   Anxiety is a condition that causes you to feel extremely worried or nervous. The feelings are so strong that they can cause problems with your daily activities or sleep. Anxiety may be triggered by something you fear, or it may happen without a cause. Family or work stress, smoking, caffeine, and alcohol can increase your risk for anxiety. Certain medicines or health conditions can also increase your risk. Anxiety can become a long-term condition if it is not managed or treated. DISCHARGE INSTRUCTIONS:   Call your local emergency number (911 in the [address]) if:   You have chest pain, tightness, or heaviness that may spread to your shoulders, arms, jaw, neck, or back. You think about harming yourself or someone else. Call your doctor if:   Your symptoms get worse or do not get better with treatment. Your anxiety keeps you from doing your regular daily activities. You have new symptoms since your last visit. You have questions or concerns about your condition or care. The following resources are available at any time to help you, if needed:   Contact a suicide prevention organization: For the [employer] Suicide and Crisis Lifeline:     Call or text 89545 29 59 95 a chat on https://eNovance.org/chat     Call 3-567.907.7868 (4-231-270-TALK)    For the Suicide Hotline, call 5-697.675.7267 (5-599-MVZLFXG)    For a list of international numbers: https://save.org/find-help/international-resources/  Medicines: You may need any of the following:  Anxiety or antidepressant medicine  may help relieve or prevent anxiety. You may need to take the medicine for several weeks before you begin to feel better. Tell your healthcare provider about any side effects or problems you have with your medicine. The type or amount of medicine may need to be changed. Take your medicine as directed.   Contact your healthcare provider if you think your medicine is not helping or if you have side effects. Tell your provider if you are allergic to any medicine. Keep a list of the medicines, vitamins, and herbs you take. Include the amounts, and when and why you take them. Bring the list or the pill bottles to follow-up visits. Carry your medicine list with you in case of an emergency. Cognitive behavioral therapy (CBT)  teaches you how to identify and change negative thought patterns. Manage anxiety:   Talk to someone about your anxiety. Your healthcare provider may suggest counseling. You might feel more comfortable talking with a friend or family member about your anxiety. Choose someone you know will be supportive and encouraging. Get regular physical activity. Physical activity can lower your stress, improve your mood, and help you sleep better. Work with your healthcare provider to develop a plan that you enjoy. Create a regular sleep schedule. A routine can help you relax before bed. Listen to music, read, or do yoga. Try to go to bed and wake up at the same time every day. Sleep is important for emotional health. Do activities you enjoy. Spend time with friends, or do something fun. Choose activities you are familiar with or comfortable doing. This may help prevent anxiety. Practice deep breathing. Deep breathing can help you relax when you feel anxious. Focus on taking slow, deep breaths several times a day, or during an anxiety attack. Breathe in through your nose and out through your mouth. Deep breathing combined with meditation or listening to music may help you feel calmer. Do not smoke. Nicotine and other chemicals in cigarettes and cigars can increase anxiety. Ask your healthcare provider for information if you currently smoke and need help to quit. E-cigarettes or smokeless tobacco still contain nicotine. Talk to your healthcare provider before you use these products. Do not have caffeine. Caffeine can make your symptoms worse.  Do not have foods or drinks that are meant to increase your energy level. Do not drink alcohol or use drugs. Alcohol and drugs can worsen anxiety or make it hard to manage. Talk to your therapist or healthcare provider if you need help to quit. Follow up with your therapist or doctor as directed: Your healthcare provider will monitor your progress at follow-up visits. Your provider will also monitor your medicine if you take antidepressants and ask if the medicine is helping. Tell your provider about any side effects or problems you have with your medicine. The type or amount of medicine may need to be changed. Write down your questions so you remember to ask them during your visits. For more information or support:   [employer] [facility]  [address] [facility] , [address] [facility] , [address] [place] [address]  Phone: 7- [phone]  Phone: 4- [phone] - [phone]  Web Address: http://[employer].PlasmaSi/. org  [address] [facility] Suicide and [address] [place] [phone]  Phone: 9- 864 - 251  Web Address: [employer]Pult. org OR https://[employer]/chat/    © Copyright [employer] 2023 Information is for End User's use only and may not be sold, redistributed or otherwise used for commercial purposes. The above information is an  only. It is not intended as medical advice for individual conditions or treatments. Talk to your doctor, nurse or pharmacist before following any medical regimen to see if it is safe and effective for you.

## 2023-10-05 NOTE — ASSESSMENT & PLAN NOTE
Continue exercise at home and Antivert 12.5 mg 3 times a day as needed symptoms seems to be controlled with this regimen

## 2023-11-03 DIAGNOSIS — F32.4 MAJOR DEPRESSIVE DISORDER WITH SINGLE EPISODE, IN PARTIAL REMISSION (HCC): ICD-10-CM

## 2023-11-03 RX ORDER — DULOXETIN HYDROCHLORIDE 60 MG/1
CAPSULE, DELAYED RELEASE ORAL
Qty: 30 CAPSULE | Refills: 1 | Status: SHIPPED | OUTPATIENT
Start: 2023-11-03

## 2023-12-03 DIAGNOSIS — F32.4 MAJOR DEPRESSIVE DISORDER WITH SINGLE EPISODE, IN PARTIAL REMISSION (HCC): ICD-10-CM

## 2023-12-03 DIAGNOSIS — F41.1 GENERALIZED ANXIETY DISORDER: ICD-10-CM

## 2023-12-03 DIAGNOSIS — F41.9 ANXIETY: ICD-10-CM

## 2023-12-04 RX ORDER — LORAZEPAM 0.5 MG/1
0.5 TABLET ORAL DAILY PRN
Qty: 30 TABLET | Refills: 2 | Status: SHIPPED | OUTPATIENT
Start: 2023-12-04

## 2023-12-04 RX ORDER — DULOXETIN HYDROCHLORIDE 60 MG/1
60 CAPSULE, DELAYED RELEASE ORAL EVERY MORNING
Qty: 30 CAPSULE | Refills: 5 | Status: SHIPPED | OUTPATIENT
Start: 2023-12-04

## 2024-01-03 DIAGNOSIS — F41.9 ANXIETY: ICD-10-CM

## 2024-01-03 DIAGNOSIS — F41.1 GENERALIZED ANXIETY DISORDER: ICD-10-CM

## 2024-01-03 RX ORDER — LORAZEPAM 0.5 MG/1
0.5 TABLET ORAL DAILY PRN
Qty: 30 TABLET | Refills: 0 | Status: SHIPPED | OUTPATIENT
Start: 2024-01-03

## 2024-02-01 DIAGNOSIS — F41.9 ANXIETY: ICD-10-CM

## 2024-02-01 DIAGNOSIS — F41.1 GENERALIZED ANXIETY DISORDER: ICD-10-CM

## 2024-02-01 RX ORDER — LORAZEPAM 0.5 MG/1
0.5 TABLET ORAL DAILY PRN
Qty: 30 TABLET | Refills: 1 | Status: SHIPPED | OUTPATIENT
Start: 2024-02-01

## 2024-02-22 ENCOUNTER — OFFICE VISIT (OUTPATIENT)
Dept: INTERNAL MEDICINE CLINIC | Facility: CLINIC | Age: 49
End: 2024-02-22
Payer: COMMERCIAL

## 2024-02-22 VITALS
DIASTOLIC BLOOD PRESSURE: 68 MMHG | BODY MASS INDEX: 20.65 KG/M2 | WEIGHT: 109.4 LBS | OXYGEN SATURATION: 98 % | HEART RATE: 80 BPM | HEIGHT: 61 IN | SYSTOLIC BLOOD PRESSURE: 106 MMHG

## 2024-02-22 DIAGNOSIS — R42 VERTIGO: ICD-10-CM

## 2024-02-22 DIAGNOSIS — F41.0 GENERALIZED ANXIETY DISORDER WITH PANIC ATTACKS: ICD-10-CM

## 2024-02-22 DIAGNOSIS — F32.5 MAJOR DEPRESSIVE DISORDER WITH SINGLE EPISODE, IN FULL REMISSION (HCC): Primary | ICD-10-CM

## 2024-02-22 DIAGNOSIS — F41.1 GENERALIZED ANXIETY DISORDER WITH PANIC ATTACKS: ICD-10-CM

## 2024-02-22 PROCEDURE — 99213 OFFICE O/P EST LOW 20 MIN: CPT | Performed by: INTERNAL MEDICINE

## 2024-02-22 RX ORDER — DULOXETIN HYDROCHLORIDE 30 MG/1
30 CAPSULE, DELAYED RELEASE ORAL DAILY
Qty: 90 CAPSULE | Refills: 1 | Status: SHIPPED | OUTPATIENT
Start: 2024-02-22 | End: 2024-05-22

## 2024-02-22 RX ORDER — BUSPIRONE HYDROCHLORIDE 15 MG/1
15 TABLET ORAL 3 TIMES DAILY
Qty: 270 TABLET | Refills: 1 | Status: SHIPPED | OUTPATIENT
Start: 2024-02-22

## 2024-02-22 RX ORDER — LORAZEPAM 0.5 MG/1
0.5 TABLET ORAL DAILY PRN
Qty: 30 TABLET | Refills: 2 | Status: SHIPPED | OUTPATIENT
Start: 2024-02-22

## 2024-02-22 RX ORDER — DULOXETIN HYDROCHLORIDE 60 MG/1
60 CAPSULE, DELAYED RELEASE ORAL EVERY MORNING
Qty: 90 CAPSULE | Refills: 1 | Status: SHIPPED | OUTPATIENT
Start: 2024-02-22

## 2024-02-22 NOTE — ASSESSMENT & PLAN NOTE
Symptoms controlled in remission    Agree and continue management as follows    Cymbalta 60 mg daily in the morning    Cymbalta 30 mg daily in the evening

## 2024-02-22 NOTE — PROGRESS NOTES
Dr. Chacon's Office Visit Note  24     Crystal Dickinson 48 y.o. female MRN: [id number]  : 1975    Assessment:     1. Major depressive disorder with single episode, in full remission ([facility])  Assessment & Plan:  Symptoms controlled in remission    Agree and continue management as follows    Cymbalta 60 mg daily in the morning    Cymbalta 30 mg daily in the evening    Orders:  -     DULoxetine (CYMBALTA) 30 mg delayed release capsule; Take 1 capsule (30 mg total) by mouth daily  -     DULoxetine (CYMBALTA) 60 mg delayed release capsule; Take 1 capsule (60 mg total) by mouth every morning    2. Vertigo  Assessment & Plan:  Doing exercises at home for now resolved if needed advised to use Antivert as needed      3. Generalized anxiety disorder with panic attacks  Assessment & Plan:  Symptoms much controlled able to function and work full-time with current regimen    Agree and continue management medication as follows    Lorazepam 0.5 mg 1 as needed for anxiety panic attack    BuSpar 15 mg 3 times a day no side effects    Orders:  -     busPIRone (BUSPAR) 15 mg tablet; Take 1 tablet (15 mg total) by mouth 3 (three) times a day  -     LORazepam (ATIVAN) 0.5 mg tablet; Take 1 tablet (0.5 mg total) by mouth daily as needed for anxiety          Discussion Summary and Plan:  Today's care plan and medications were reviewed with patient in detail and all their questions answered to their satisfaction.    Chief Complaint   Patient presents with   • Follow-up     For medication refills.      Subjective:  Patient came in follow-up chronic medical condition listed visit diagnosis and refill for the meds patient's symptoms very much controlled depression in remission anxiety symptoms controlled no chest pain difficulty breathing for details refer to assessment plan under visit diagnosis        The following portions of the patient's history were reviewed and updated as appropriate: allergies, current medications, past family  history, past medical history, past social history, past surgical history and problem list.    Review of Systems   Constitutional:  Negative for appetite change, chills, diaphoresis, fatigue, fever and unexpected weight change.   HENT:  Negative for congestion, dental problem, drooling, ear discharge, ear pain, facial swelling, hearing loss, mouth sores, nosebleeds, postnasal drip, rhinorrhea, sinus pressure, sneezing, sore throat, tinnitus, trouble swallowing and voice change.    Eyes:  Negative for photophobia, pain, discharge, redness, itching and visual disturbance.   Respiratory:  Negative for apnea, cough, choking, chest tightness, shortness of breath, wheezing and stridor.    Cardiovascular:  Negative for chest pain, palpitations and leg swelling.   Gastrointestinal:  Negative for abdominal distention, abdominal pain, anal bleeding, blood in stool, constipation, diarrhea, nausea, rectal pain and vomiting.   Endocrine: Negative for cold intolerance, heat intolerance, polydipsia, polyphagia and polyuria.   Genitourinary:  Negative for decreased urine volume, difficulty urinating, dysuria, enuresis, flank pain, frequency, genital sores, hematuria and urgency.   Musculoskeletal:  Negative for back pain, gait problem, joint swelling, myalgias, neck pain and neck stiffness.   Skin:  Negative for color change, pallor, rash and wound.   Allergic/Immunologic: Negative.  Negative for environmental allergies, food allergies and immunocompromised state.   Neurological:  Negative for dizziness, tremors, seizures, syncope, facial asymmetry, speech difficulty, weakness, light-headedness, numbness and headaches.   Psychiatric/Behavioral:  Negative for agitation, behavioral problems, confusion, decreased concentration, dysphoric mood, hallucinations, self-injury, sleep disturbance and suicidal ideas. The patient is nervous/anxious. The patient is not hyperactive.          Historical Information   Patient Active Problem List    Diagnosis   • Anxiety   • Fall   • Major depressive disorder with single episode, in full remission (HCC)   • Osteopenia   • Allergic rhinitis   • Generalized anxiety disorder with panic attacks   • Vertigo     Past Medical History:   Diagnosis Date   • Abdominal pain    • Anxiety    • Dizziness    • Lightheadedness    • Palpitations    • SOB (shortness of breath)      Past Surgical History:   Procedure Laterality Date   • DXA PROCEDURE (HISTORICAL)  06/16/2020     Social History     Substance and Sexual Activity   Alcohol Use No     Social History     Substance and Sexual Activity   Drug Use No     Social History     Tobacco Use   Smoking Status Never   Smokeless Tobacco Never     Family History   Problem Relation Age of Onset   • Diabetes Father    • No Known Problems Brother    • No Known Problems Brother    • No Known Problems Son    • No Known Problems Son    • No Known Problems Paternal Uncle      Health Maintenance Due   Topic   • Hepatitis C Screening    • Cervical Cancer Screening    • Colorectal Cancer Screening    • Influenza Vaccine (1)   • COVID-19 Vaccine (1 - 2023-24 season)   • Breast Cancer Screening: Mammogram    • Annual Physical       Meds/Allergies       Current Outpatient Medications:   •  busPIRone (BUSPAR) 15 mg tablet, Take 1 tablet (15 mg total) by mouth 3 (three) times a day, Disp: 270 tablet, Rfl: 1  •  DULoxetine (CYMBALTA) 30 mg delayed release capsule, Take 1 capsule (30 mg total) by mouth daily, Disp: 90 capsule, Rfl: 1  •  DULoxetine (CYMBALTA) 60 mg delayed release capsule, Take 1 capsule (60 mg total) by mouth every morning, Disp: 90 capsule, Rfl: 1  •  LORazepam (ATIVAN) 0.5 mg tablet, Take 1 tablet (0.5 mg total) by mouth daily as needed for anxiety, Disp: 30 tablet, Rfl: 2  •  ergocalciferol (VITAMIN D2) 50,000 units, Take 1 capsule (50,000 Units total) by mouth once a week (Patient not taking: Reported on 10/5/2023), Disp: 12 capsule, Rfl: 0  •  meclizine (ANTIVERT) 25 mg  "tablet, Take 1 tablet (25 mg total) by mouth 3 (three) times a day as needed for dizziness for up to 5 days, Disp: 15 tablet, Rfl: 0      Objective:    Vitals:   /68   Pulse 80   Ht 5' 1\" (1.549 m)   Wt 49.6 kg (109 lb 6.4 oz)   LMP 07/27/2021   SpO2 98%   BMI 20.67 kg/m²   Body mass index is 20.67 kg/m².  Vitals:    02/22/24 1142   Weight: 49.6 kg (109 lb 6.4 oz)       Physical Exam  Constitutional:       General: She is not in acute distress.     Appearance: She is well-developed. She is not ill-appearing, toxic-appearing or diaphoretic.   HENT:      Head: Normocephalic and atraumatic.      Right Ear: External ear normal.      Left Ear: External ear normal.      Nose: Nose normal.      Mouth/Throat:      Pharynx: No oropharyngeal exudate.   Eyes:      General: Lids are normal. Lids are everted, no foreign bodies appreciated. No scleral icterus.        Right eye: No discharge.         Left eye: No discharge.      Conjunctiva/sclera: Conjunctivae normal.      Pupils: Pupils are equal, round, and reactive to light.   Neck:      Thyroid: No thyromegaly.      Vascular: Normal carotid pulses. No carotid bruit, hepatojugular reflux or JVD.      Trachea: No tracheal tenderness or tracheal deviation.   Cardiovascular:      Rate and Rhythm: Normal rate and regular rhythm.      Pulses: Normal pulses.      Heart sounds: Normal heart sounds. No murmur heard.     No friction rub. No gallop.   Pulmonary:      Effort: Pulmonary effort is normal. No respiratory distress.      Breath sounds: Normal breath sounds. No stridor. No wheezing or rales.   Chest:      Chest wall: No tenderness.   Abdominal:      General: Bowel sounds are normal. There is no distension.      Palpations: Abdomen is soft. There is no mass.      Tenderness: There is no abdominal tenderness. There is no guarding or rebound.   Musculoskeletal:         General: No tenderness or deformity. Normal range of motion.      Cervical back: Normal range of " motion and neck supple. No edema, erythema or rigidity. No spinous process tenderness or muscular tenderness. Normal range of motion.   Lymphadenopathy:      Head:      Right side of head: No submental, submandibular, tonsillar, preauricular or posterior auricular adenopathy.      Left side of head: No submental, submandibular, tonsillar, preauricular, posterior auricular or occipital adenopathy.      Cervical: No cervical adenopathy.      Right cervical: No superficial, deep or posterior cervical adenopathy.     Left cervical: No superficial, deep or posterior cervical adenopathy.      Upper Body:      Right upper body: No pectoral adenopathy.      Left upper body: No pectoral adenopathy.   Skin:     General: Skin is warm and dry.      Coloration: Skin is not pale.      Findings: No erythema or rash.   Neurological:      Mental Status: She is alert and oriented to person, place, and time.      Cranial Nerves: No cranial nerve deficit.      Sensory: No sensory deficit.      Motor: No tremor, abnormal muscle tone or seizure activity.      Coordination: Coordination normal.      Gait: Gait normal.      Deep Tendon Reflexes: Reflexes are normal and symmetric. Reflexes normal.   Psychiatric:         Behavior: Behavior normal.         Thought Content: Thought content normal.         Judgment: Judgment normal.         Lab Review   No visits with results within 2 Month(s) from this visit.   Latest known visit with results is:   Orders Only on 05/19/2023   Component Date Value Ref Range Status   • White Blood Cell Count 05/19/2023 5.5  3.4 - 10.8 x10E3/uL Final   • Red Blood Cell Count 05/19/2023 4.07  3.77 - 5.28 x10E6/uL Final   • Hemoglobin 05/19/2023 13.1  11.1 - 15.9 g/dL Final   • HCT 05/19/2023 37.6  34.0 - 46.6 % Final   • MCV 05/19/2023 92  79 - 97 fL Final   • MCH 05/19/2023 32.2  26.6 - 33.0 pg Final   • MCHC 05/19/2023 34.8  31.5 - 35.7 g/dL Final   • RDW 05/19/2023 11.5 (L)  11.7 - 15.4 % Final   • Platelet  Count 05/19/2023 257  150 - 450 x10E3/uL Final   • Neutrophils 05/19/2023 55  Not Estab. % Final   • Lymphocytes 05/19/2023 32  Not Estab. % Final   • Monocytes 05/19/2023 10  Not Estab. % Final   • Eosinophils 05/19/2023 1  Not Estab. % Final   • Basophils PCT 05/19/2023 2  Not Estab. % Final   • Neutrophils (Absolute) 05/19/2023 3.1  1.4 - 7.0 x10E3/uL Final   • Lymphocytes (Absolute) 05/19/2023 1.7  0.7 - 3.1 x10E3/uL Final   • Monocytes (Absolute) 05/19/2023 0.6  0.1 - 0.9 x10E3/uL Final   • Eosinophils (Absolute) 05/19/2023 0.0  0.0 - 0.4 x10E3/uL Final   • Basophils ABS 05/19/2023 0.1  0.0 - 0.2 x10E3/uL Final   • Immature Granulocytes 05/19/2023 0  Not Estab. % Final   • Immature Granulocytes (Absolute) 05/19/2023 0.0  0.0 - 0.1 x10E3/uL Final    Comment: A hand-written panel/profile was received from your office. In  accordance with the [facility] Ambiguous Test Code Policy dated July 2003, we have assigned CBC with Differential/Platelet, Test Code  #[id number] to this request. If this is not the testing you wished to  receive on this specimen, please contact the [facility] Client Inquiry/  Technical Services Department to clarify the test order. We  appreciate your business.     • Glucose, Random 05/19/2023 51 (L)  70 - 99 mg/dL Final   • BUN 05/19/2023 21  6 - 24 mg/dL Final   • Creatinine 05/19/2023 0.60  0.57 - 1.00 mg/dL Final   • eGFR 05/19/2023 112  >59 mL/min/1.73 Final   • SL AMB BUN/CREATININE RATIO 05/19/2023 35 (H)  9 - 23 Final   • Sodium 05/19/2023 139  134 - 144 mmol/L Final   • Potassium 05/19/2023 4.1  3.5 - 5.2 mmol/L Final   • Chloride 05/19/2023 104  96 - 106 mmol/L Final   • CO2 05/19/2023 25  20 - 29 mmol/L Final   • CALCIUM 05/19/2023 9.1  8.7 - 10.2 mg/dL Final   • Protein, Total 05/19/2023 6.9  6.0 - 8.5 g/dL Final   • Albumin 05/19/2023 4.4  3.8 - 4.8 g/dL Final   • Globulin, Total 05/19/2023 2.5  1.5 - 4.5 g/dL Final   • Albumin/Globulin Ratio 05/19/2023 1.8  1.2 - 2.2 Final   • TOTAL  BILIRUBIN 05/19/2023 <0.2  0.0 - 1.2 mg/dL Final   • Alk Phos Isoenzymes 05/19/2023 52  44 - 121 IU/L Final   • AST 05/19/2023 23  0 - 40 IU/L Final   • ALT 05/19/2023 25  0 - 32 IU/L Final   • Specific Gravity 05/19/2023 1.007  1.005 - 1.030 Final   • Ph 05/19/2023 7.0  5.0 - 7.5 Final   • Color UA 05/19/2023 Yellow  Yellow Final   • Urine Appearance 05/19/2023 Clear  Clear Final   • Leukocyte Esterase 05/19/2023 Trace (A)  Negative Final   • Protein 05/19/2023 Negative  Negative/Trace Final   • Glucose, 24 HR Urine 05/19/2023 Negative  Negative Final   • Ketone, Urine 05/19/2023 Negative  Negative Final   • Blood, Urine 05/19/2023 Negative  Negative Final   • Bilirubin, Urine 05/19/2023 Negative  Negative Final   • Urobilinogen Urine 05/19/2023 0.2  0.2 - 1.0 mg/dL Final   • Nitrites Urine 05/19/2023 Negative  Negative Final   • Microscopic Examination 05/19/2023 See below:   Final    Microscopic was indicated and was performed.   • SL AMB WBC, URINE 05/19/2023 0-5  0 - 5 /hpf Final   • RBC, Urine 05/19/2023 None seen  0 - 2 /hpf Final   • Epithelial Cells (non renal) 05/19/2023 None seen  0 - 10 /hpf Final   • Casts 05/19/2023 None seen  None seen /lpf Final   • Bacteria, Urine 05/19/2023 None seen  None seen/Few Final   • Cholesterol, Total 05/19/2023 209 (H)  100 - 199 mg/dL Final   • Triglycerides 05/19/2023 104  0 - 149 mg/dL Final   • HDL 05/19/2023 66  >39 mg/dL Final   • LDL Calculated 05/19/2023 125 (H)  0 - 99 mg/dL Final   • Creatinine, Urine 05/19/2023 4.8  Not Estab. mg/dL Final   • Albumin,U,Random 05/19/2023 <3.0  Not Estab. ug/mL Final   • Microalb/Creat Ratio 05/19/2023 Comment (A)  0 - 29 mg/g creat Final    Comment: The result is below the assay's limit of quantitation which may  indicate a dilute specimen or other clinical condition. Consider  recollection at a time likely to provide a urine that is more  concentrated.                         Normal:                0 -  29                          Moderately increased: 30 - 300                         Severely increased:       >300     • Hemoglobin A1C 05/19/2023 5.0  4.8 - 5.6 % Final    Comment:          Prediabetes: 5.7 - 6.4           Diabetes: >6.4           Glycemic control for adults with diabetes: <7.0     • 25-HYDROXY VIT D 05/19/2023 20.2 (L)  30.0 - 100.0 ng/mL Final    Comment: Vitamin D deficiency has been defined by the [place] of  Medicine and an Endocrine Society practice guideline as a  level of serum 25-OH vitamin D less than 20 ng/mL (1,2).  The Endocrine Society went on to further define vitamin D  insufficiency as a level between 21 and 29 ng/mL (2).  1. IOM ([place] of Medicine). 2010. Dietary reference     intakes for calcium and D. [place]: The     National Academies Press.  2. Alison STOLL, [place] ROJAS, Marianna RUVALCABA, et al.     Evaluation, treatment, and prevention of vitamin D     deficiency: an Endocrine Society clinical practice     guideline. JCEM. 2011 Jul; 96(7):1911-30.           Patient Instructions   Anxiety   WHAT YOU NEED TO KNOW:   Anxiety is a condition that causes you to feel extremely worried or nervous. The feelings are so strong that they can cause problems with your daily activities or sleep. Anxiety may be triggered by something you fear, or it may happen without a cause. Family or work stress, smoking, caffeine, and alcohol can increase your risk for anxiety. Certain medicines or health conditions can also increase your risk. Anxiety can become a long-term condition if it is not managed or treated.  DISCHARGE INSTRUCTIONS:   Call your local emergency number (911 in the [place]) if:   You have chest pain, tightness, or heaviness that may spread to your shoulders, arms, jaw, neck, or back.    You think about harming yourself or someone else.    Call your doctor if:   Your symptoms get worse or do not get better with treatment.    Your anxiety keeps you from doing your regular daily activities.    You have  new symptoms since your last visit.    You have questions or concerns about your condition or care.    The following resources are available at any time to help you, if needed:   Contact a suicide prevention organization:        For the 98 Suicide and Crisis Lifeline:     Call or text [facility]     Send a chat on [email or web address]://[email or web address]/chat     Call 7-453-813-7442 (1-800-273-TALK)    For the Suicide Hotline, call 1-609-687-7039 (5-439-WOBAEVA)    For a list of international numbers: https://save.org/find-help/international-resources/  Medicines:  You may need any of the following:  Anxiety or antidepressant medicine  may help relieve or prevent anxiety. You may need to take the medicine for several weeks before you begin to feel better. Tell your healthcare provider about any side effects or problems you have with your medicine. The type or amount of medicine may need to be changed.    Take your medicine as directed.  Contact your healthcare provider if you think your medicine is not helping or if you have side effects. Tell your provider if you are allergic to any medicine. Keep a list of the medicines, vitamins, and herbs you take. Include the amounts, and when and why you take them. Bring the list or the pill bottles to follow-up visits. Carry your medicine list with you in case of an emergency.    Cognitive behavioral therapy (CBT)  teaches you how to identify and change negative thought patterns.  Manage anxiety:   Talk to someone about your anxiety.  Your healthcare provider may suggest counseling. You might feel more comfortable talking with a friend or family member about your anxiety. Choose someone you know will be supportive and encouraging.    Get regular physical activity.  Physical activity can lower your stress, improve your mood, and help you sleep better. Work with your healthcare provider to develop a plan that you enjoy.         Create a regular sleep schedule.  A routine can help you relax before  bed. Listen to music, read, or do yoga. Try to go to bed and wake up at the same time every day. Sleep is important for emotional health.    Do activities you enjoy.  Spend time with friends, or do something fun. Choose activities you are familiar with or comfortable doing. This may help prevent anxiety.    Practice deep breathing.  Deep breathing can help you relax when you feel anxious. Focus on taking slow, deep breaths several times a day, or during an anxiety attack. Breathe in through your nose and out through your mouth. Deep breathing combined with meditation or listening to music may help you feel calmer.    Do not smoke.  Nicotine and other chemicals in cigarettes and cigars can increase anxiety. Ask your healthcare provider for information if you currently smoke and need help to quit. E-cigarettes or smokeless tobacco still contain nicotine. Talk to your healthcare provider before you use these products.    Do not have caffeine.  Caffeine can make your symptoms worse. Do not have foods or drinks that are meant to increase your energy level.    Do not drink alcohol or use drugs.  Alcohol and drugs can worsen anxiety or make it hard to manage. Talk to your therapist or healthcare provider if you need help to quit.       Follow up with your therapist or doctor as directed:  Your healthcare provider will monitor your progress at follow-up visits. Your provider will also monitor your medicine if you take antidepressants and ask if the medicine is helping. Tell your provider about any side effects or problems you have with your medicine. The type or amount of medicine may need to be changed. Write down your questions so you remember to ask them during your visits.  For more information or support:   National [place] [employer]ness  [address]  [place] [address]  Phone: 1- 298 - 276-2487  Phone: 8- 347 - 842-5054  Web Address: http://www.[facility].[employer]  [address] Suicide and Crisis Lifeline  [address]  [address]  [place] [address]  Phone: 7- 938 - 433  Web Address: http://www.suicidepreventionlifeline.org OR https://[employer]/chat/    © Copyright [employer] 2023 Information is for End User's use only and may not be sold, redistributed or otherwise used for commercial purposes.  The above information is an  only. It is not intended as medical advice for individual conditions or treatments. Talk to your doctor, nurse or pharmacist before following any medical regimen to see if it is safe and effective for you.       Roselyn Chacon MD        \"This note has been constructed using a voice recognition system.Therefore there may be syntax, spelling, and/or grammatical errors. Please call if you have any questions. \"  "

## 2024-02-22 NOTE — PATIENT INSTRUCTIONS
Anxiety   WHAT YOU NEED TO KNOW:   Anxiety is a condition that causes you to feel extremely worried or nervous. The feelings are so strong that they can cause problems with your daily activities or sleep. Anxiety may be triggered by something you fear, or it may happen without a cause. Family or work stress, smoking, caffeine, and alcohol can increase your risk for anxiety. Certain medicines or health conditions can also increase your risk. Anxiety can become a long-term condition if it is not managed or treated.  DISCHARGE INSTRUCTIONS:   Call your local emergency number (911 in the ) if:   You have chest pain, tightness, or heaviness that may spread to your shoulders, arms, jaw, neck, or back.    You think about harming yourself or someone else.    Call your doctor if:   Your symptoms get worse or do not get better with treatment.    Your anxiety keeps you from doing your regular daily activities.    You have new symptoms since your last visit.    You have questions or concerns about your condition or care.    The following resources are available at any time to help you, if needed:   Contact a suicide prevention organization:        For the [employer] Suicide and Crisis Lifeline:     Call or text [employer]     Send a chat on [email or web address]org/chat     Call 1-278.464.6375 (1-800-273-TALK)    For the Suicide Hotline, call 1-455.422.9732 (7-237-CMSDUCD)    For a list of international numbers: https://save.org/find-help/international-resources/  Medicines:  You may need any of the following:  Anxiety or antidepressant medicine  may help relieve or prevent anxiety. You may need to take the medicine for several weeks before you begin to feel better. Tell your healthcare provider about any side effects or problems you have with your medicine. The type or amount of medicine may need to be changed.    Take your medicine as directed.  Contact your healthcare provider if you think your medicine is not helping or if you have side  effects. Tell your provider if you are allergic to any medicine. Keep a list of the medicines, vitamins, and herbs you take. Include the amounts, and when and why you take them. Bring the list or the pill bottles to follow-up visits. Carry your medicine list with you in case of an emergency.    Cognitive behavioral therapy (CBT)  teaches you how to identify and change negative thought patterns.  Manage anxiety:   Talk to someone about your anxiety.  Your healthcare provider may suggest counseling. You might feel more comfortable talking with a friend or family member about your anxiety. Choose someone you know will be supportive and encouraging.    Get regular physical activity.  Physical activity can lower your stress, improve your mood, and help you sleep better. Work with your healthcare provider to develop a plan that you enjoy.         Create a regular sleep schedule.  A routine can help you relax before bed. Listen to music, read, or do yoga. Try to go to bed and wake up at the same time every day. Sleep is important for emotional health.    Do activities you enjoy.  Spend time with friends, or do something fun. Choose activities you are familiar with or comfortable doing. This may help prevent anxiety.    Practice deep breathing.  Deep breathing can help you relax when you feel anxious. Focus on taking slow, deep breaths several times a day, or during an anxiety attack. Breathe in through your nose and out through your mouth. Deep breathing combined with meditation or listening to music may help you feel calmer.    Do not smoke.  Nicotine and other chemicals in cigarettes and cigars can increase anxiety. Ask your healthcare provider for information if you currently smoke and need help to quit. E-cigarettes or smokeless tobacco still contain nicotine. Talk to your healthcare provider before you use these products.    Do not have caffeine.  Caffeine can make your symptoms worse. Do not have foods or drinks that  are meant to increase your energy level.    Do not drink alcohol or use drugs.  Alcohol and drugs can worsen anxiety or make it hard to manage. Talk to your therapist or healthcare provider if you need help to quit.       Follow up with your therapist or doctor as directed:  Your healthcare provider will monitor your progress at follow-up visits. Your provider will also monitor your medicine if you take antidepressants and ask if the medicine is helping. Tell your provider about any side effects or problems you have with your medicine. The type or amount of medicine may need to be changed. Write down your questions so you remember to ask them during your visits.  For more information or support:   [employer] [place] [employer]  [address]  [place] [address]  Phone: 2- 905 - [phone]  Phone: 2- 556 - [phone]  Web Address: http://www.christianne.Renrendai  [facility] Suicide and Crisis Lifeline  [address]  [place] [address]  Phone: 0- 319 - 677  Web Address: http://www.suicidepreventionlifeline.org OR https://[employer].org/chat/    © Copyright [employer] 2023 Information is for End User's use only and may not be sold, redistributed or otherwise used for commercial purposes.  The above information is an  only. It is not intended as medical advice for individual conditions or treatments. Talk to your doctor, nurse or pharmacist before following any medical regimen to see if it is safe and effective for you.

## 2024-02-22 NOTE — ASSESSMENT & PLAN NOTE
Symptoms much controlled able to function and work full-time with current regimen    Agree and continue management medication as follows    Lorazepam 0.5 mg 1 as needed for anxiety panic attack    BuSpar 15 mg 3 times a day no side effects

## 2024-05-21 DIAGNOSIS — F32.5 MAJOR DEPRESSIVE DISORDER WITH SINGLE EPISODE, IN FULL REMISSION (HCC): ICD-10-CM

## 2024-05-21 DIAGNOSIS — F41.1 GENERALIZED ANXIETY DISORDER WITH PANIC ATTACKS: ICD-10-CM

## 2024-05-21 DIAGNOSIS — F41.0 GENERALIZED ANXIETY DISORDER WITH PANIC ATTACKS: ICD-10-CM

## 2024-05-22 RX ORDER — BUSPIRONE HYDROCHLORIDE 15 MG/1
TABLET ORAL
Qty: 270 TABLET | Refills: 1 | Status: SHIPPED | OUTPATIENT
Start: 2024-05-22

## 2024-05-22 RX ORDER — DULOXETIN HYDROCHLORIDE 60 MG/1
CAPSULE, DELAYED RELEASE ORAL
Qty: 90 CAPSULE | Refills: 1 | Status: SHIPPED | OUTPATIENT
Start: 2024-05-22

## 2024-05-22 RX ORDER — DULOXETIN HYDROCHLORIDE 30 MG/1
CAPSULE, DELAYED RELEASE ORAL
Qty: 90 CAPSULE | Refills: 1 | Status: SHIPPED | OUTPATIENT
Start: 2024-05-22

## 2024-05-29 DIAGNOSIS — F41.0 GENERALIZED ANXIETY DISORDER WITH PANIC ATTACKS: ICD-10-CM

## 2024-05-29 DIAGNOSIS — F41.1 GENERALIZED ANXIETY DISORDER WITH PANIC ATTACKS: ICD-10-CM

## 2024-05-30 RX ORDER — LORAZEPAM 0.5 MG/1
TABLET ORAL
Qty: 30 TABLET | Refills: 0 | Status: SHIPPED | OUTPATIENT
Start: 2024-05-30

## 2024-06-21 ENCOUNTER — OFFICE VISIT (OUTPATIENT)
Dept: INTERNAL MEDICINE CLINIC | Facility: CLINIC | Age: 49
End: 2024-06-21
Payer: COMMERCIAL

## 2024-06-21 VITALS
RESPIRATION RATE: 14 BRPM | WEIGHT: 109 LBS | SYSTOLIC BLOOD PRESSURE: 92 MMHG | OXYGEN SATURATION: 99 % | TEMPERATURE: 99.3 F | BODY MASS INDEX: 20.58 KG/M2 | HEART RATE: 85 BPM | HEIGHT: 61 IN | DIASTOLIC BLOOD PRESSURE: 58 MMHG

## 2024-06-21 DIAGNOSIS — F41.0 GENERALIZED ANXIETY DISORDER WITH PANIC ATTACKS: ICD-10-CM

## 2024-06-21 DIAGNOSIS — F32.5 MAJOR DEPRESSIVE DISORDER WITH SINGLE EPISODE, IN FULL REMISSION (HCC): Primary | ICD-10-CM

## 2024-06-21 DIAGNOSIS — F41.1 GENERALIZED ANXIETY DISORDER WITH PANIC ATTACKS: ICD-10-CM

## 2024-06-21 DIAGNOSIS — J30.1 ALLERGIC RHINITIS DUE TO POLLEN, UNSPECIFIED SEASONALITY: ICD-10-CM

## 2024-06-21 DIAGNOSIS — Z12.11 SCREENING FOR COLON CANCER: ICD-10-CM

## 2024-06-21 DIAGNOSIS — Z12.31 ENCOUNTER FOR SCREENING MAMMOGRAM FOR MALIGNANT NEOPLASM OF BREAST: ICD-10-CM

## 2024-06-21 PROCEDURE — 99213 OFFICE O/P EST LOW 20 MIN: CPT | Performed by: INTERNAL MEDICINE

## 2024-06-21 RX ORDER — LORATADINE 10 MG/1
TABLET ORAL DAILY
COMMUNITY

## 2024-06-21 RX ORDER — LORAZEPAM 0.5 MG/1
0.5 TABLET ORAL DAILY PRN
Qty: 30 TABLET | Refills: 2 | Status: SHIPPED | OUTPATIENT
Start: 2024-06-21

## 2024-06-21 NOTE — ASSESSMENT & PLAN NOTE
Symptoms much controlled able to function and work full-time with current regimen patient symptoms controlled continue agree regimen medication follow-up as follows    Agree and continue management medication as follows    Lorazepam 0.5 mg 1 as needed for anxiety panic attack    BuSpar 15 mg 3 times a day no side effects

## 2024-06-21 NOTE — PROGRESS NOTES
Dr. Chacon's Office Visit Note  24     Crystal Dickinson 48 y.o. female MRN: [id number]  : 1975    Assessment:     1. Major depressive disorder with single episode, in full remission (HCC)  Assessment & Plan:  Symptoms controlled continue current regimen as follows in remission    Agree and continue management as follows    Cymbalta 60 mg daily in the morning    Cymbalta 30 mg daily in the evening  2. Encounter for screening mammogram for malignant neoplasm of breast  -     Mammo screening bilateral w 3d & cad; Future  3. Screening for colon cancer  -     Ambulatory Referral to Gastroenterology; Future  4. Generalized anxiety disorder with panic attacks  Assessment & Plan:  Symptoms much controlled able to function and work full-time with current regimen patient symptoms controlled continue agree regimen medication follow-up as follows    Agree and continue management medication as follows    Lorazepam 0.5 mg 1 as needed for anxiety panic attack    BuSpar 15 mg 3 times a day no side effects  Orders:  -     LORazepam (ATIVAN) 0.5 mg tablet; Take 1 tablet (0.5 mg total) by mouth daily as needed for anxiety  5. Allergic rhinitis due to pollen, unspecified seasonality  Assessment & Plan:  Patient symptoms controlled with over-the-counter Claritin to use as needed        Discussion Summary and Plan:  Today's care plan and medications were reviewed with patient in detail and all their questions answered to their satisfaction.    Chief Complaint   Patient presents with   • Follow-up     Med request      Subjective:  Came in follow-up chronic medical condition listed visit diagnosis mainly refill for lorazepam 0.5 mg once a day along with BuSpar this regimen helping patient's anxiety and also recommended colonoscopy for detail refer to assessment plan visit diagnosis        The following portions of the patient's history were reviewed and updated as appropriate: allergies, current medications, past family history,  past medical history, past social history, past surgical history and problem list.    Review of Systems   Constitutional:  Positive for activity change. Negative for appetite change, chills, diaphoresis, fatigue, fever and unexpected weight change.   HENT:  Negative for congestion, dental problem, drooling, ear discharge, ear pain, facial swelling, hearing loss, mouth sores, nosebleeds, postnasal drip, rhinorrhea, sinus pressure, sneezing, sore throat, tinnitus, trouble swallowing and voice change.    Eyes:  Negative for photophobia, pain, discharge, redness, itching and visual disturbance.   Respiratory:  Negative for apnea, cough, choking, chest tightness, shortness of breath, wheezing and stridor.    Cardiovascular:  Negative for chest pain, palpitations and leg swelling.   Gastrointestinal:  Negative for abdominal distention, abdominal pain, anal bleeding, blood in stool, constipation, diarrhea, nausea, rectal pain and vomiting.   Endocrine: Negative for cold intolerance, heat intolerance, polydipsia, polyphagia and polyuria.   Genitourinary:  Negative for decreased urine volume, difficulty urinating, dysuria, enuresis, flank pain, frequency, genital sores, hematuria and urgency.   Musculoskeletal:  Negative for arthralgias, back pain, gait problem, joint swelling, myalgias, neck pain and neck stiffness.   Skin:  Negative for color change, pallor, rash and wound.   Allergic/Immunologic: Negative.  Negative for environmental allergies, food allergies and immunocompromised state.   Neurological:  Negative for dizziness, tremors, seizures, syncope, facial asymmetry, speech difficulty, weakness, light-headedness, numbness and headaches.   Psychiatric/Behavioral:  Negative for agitation, behavioral problems, confusion, decreased concentration, dysphoric mood, hallucinations, self-injury, sleep disturbance and suicidal ideas. The patient is nervous/anxious. The patient is not hyperactive.          Historical  Information   Patient Active Problem List   Diagnosis   • Anxiety   • Fall   • Major depressive disorder with single episode, in full remission (HCC)   • Osteopenia   • Allergic rhinitis   • Generalized anxiety disorder with panic attacks   • Vertigo     Past Medical History:   Diagnosis Date   • Abdominal pain    • Anxiety    • Dizziness    • Lightheadedness    • Palpitations    • SOB (shortness of breath)      Past Surgical History:   Procedure Laterality Date   • DXA PROCEDURE (HISTORICAL)  06/16/2020     Social History     Substance and Sexual Activity   Alcohol Use No     Social History     Substance and Sexual Activity   Drug Use No     Social History     Tobacco Use   Smoking Status Never   Smokeless Tobacco Never     Family History   Problem Relation Age of Onset   • Diabetes Father    • No Known Problems Brother    • No Known Problems Brother    • No Known Problems Son    • No Known Problems Son    • No Known Problems Paternal Uncle      Health Maintenance Due   Topic   • Hepatitis C Screening    • Cervical Cancer Screening    • Colorectal Cancer Screening    • COVID-19 Vaccine (1 - 2023-24 season)   • Breast Cancer Screening: Mammogram    • Annual Physical    • Influenza Vaccine (Season Ended)      Meds/Allergies       Current Outpatient Medications:   •  busPIRone (BUSPAR) 15 mg tablet, ONE TAB 3 TIMES A DAY, Disp: 270 tablet, Rfl: 1  •  DULoxetine (CYMBALTA) 30 mg delayed release capsule, ONE CAPSULE DAILY, Disp: 90 capsule, Rfl: 1  •  DULoxetine (CYMBALTA) 60 mg delayed release capsule, ONE CAPSULE DAILY, Disp: 90 capsule, Rfl: 1  •  loratadine (Claritin) 10 mg tablet, Daily, Disp: , Rfl:   •  LORazepam (ATIVAN) 0.5 mg tablet, Take 1 tablet (0.5 mg total) by mouth daily as needed for anxiety, Disp: 30 tablet, Rfl: 2  •  meclizine (ANTIVERT) 25 mg tablet, Take 1 tablet (25 mg total) by mouth 3 (three) times a day as needed for dizziness for up to 5 days, Disp: 15 tablet, Rfl: 0      Objective:    Vitals:  "  BP 92/58   Pulse 85   Temp 99.3 °F (37.4 °C)   Resp 14   Ht 5' 1\" (1.549 m)   Wt 49.4 kg (109 lb)   LMP 07/27/2021   SpO2 99%   BMI 20.60 kg/m²   Body mass index is 20.6 kg/m².  Vitals:    06/21/24 0942   Weight: 49.4 kg (109 lb)       Physical Exam  Vitals and nursing note reviewed.   Constitutional:       General: She is not in acute distress.     Appearance: She is well-developed. She is not ill-appearing, toxic-appearing or diaphoretic.   HENT:      Head: Normocephalic and atraumatic.      Right Ear: External ear normal.      Left Ear: External ear normal.      Nose: Nose normal.      Mouth/Throat:      Pharynx: No oropharyngeal exudate.   Eyes:      General: Lids are normal. Lids are everted, no foreign bodies appreciated. No scleral icterus.        Right eye: No discharge.         Left eye: No discharge.      Conjunctiva/sclera: Conjunctivae normal.      Pupils: Pupils are equal, round, and reactive to light.   Neck:      Thyroid: No thyromegaly.      Vascular: Normal carotid pulses. No carotid bruit, hepatojugular reflux or JVD.      Trachea: No tracheal tenderness or tracheal deviation.   Cardiovascular:      Rate and Rhythm: Normal rate and regular rhythm.      Pulses: Normal pulses.      Heart sounds: Normal heart sounds. No murmur heard.     No friction rub. No gallop.   Pulmonary:      Effort: Pulmonary effort is normal. No respiratory distress.      Breath sounds: Normal breath sounds. No stridor. No wheezing or rales.   Chest:      Chest wall: No tenderness.   Abdominal:      General: Bowel sounds are normal. There is no distension.      Palpations: Abdomen is soft. There is no mass.      Tenderness: There is no abdominal tenderness. There is no guarding or rebound.   Musculoskeletal:         General: No tenderness or deformity. Normal range of motion.      Cervical back: Normal range of motion and neck supple. No edema, erythema or rigidity. No spinous process tenderness or muscular " tenderness. Normal range of motion.   Lymphadenopathy:      Head:      Right side of head: No submental, submandibular, tonsillar, preauricular or posterior auricular adenopathy.      Left side of head: No submental, submandibular, tonsillar, preauricular, posterior auricular or occipital adenopathy.      Cervical: No cervical adenopathy.      Right cervical: No superficial, deep or posterior cervical adenopathy.     Left cervical: No superficial, deep or posterior cervical adenopathy.      Upper Body:      Right upper body: No pectoral adenopathy.      Left upper body: No pectoral adenopathy.   Skin:     General: Skin is warm and dry.      Coloration: Skin is not pale.      Findings: No erythema or rash.   Neurological:      General: No focal deficit present.      Mental Status: She is alert and oriented to person, place, and time.      Cranial Nerves: No cranial nerve deficit.      Sensory: No sensory deficit.      Motor: No tremor, abnormal muscle tone or seizure activity.      Coordination: Coordination normal.      Gait: Gait normal.      Deep Tendon Reflexes: Reflexes are normal and symmetric. Reflexes normal.   Psychiatric:         Behavior: Behavior normal.         Thought Content: Thought content normal.         Judgment: Judgment normal.         Lab Review   No visits with results within 2 Month(s) from this visit.   Latest known visit with results is:   Orders Only on 05/19/2023   Component Date Value Ref Range Status   • White Blood Cell Count 05/19/2023 5.5  3.4 - 10.8 x10E3/uL Final   • Red Blood Cell Count 05/19/2023 4.07  3.77 - 5.28 x10E6/uL Final   • Hemoglobin 05/19/2023 13.1  11.1 - 15.9 g/dL Final   • HCT 05/19/2023 37.6  34.0 - 46.6 % Final   • MCV 05/19/2023 92  79 - 97 fL Final   • MCH 05/19/2023 32.2  26.6 - 33.0 pg Final   • MCHC 05/19/2023 34.8  31.5 - 35.7 g/dL Final   • RDW 05/19/2023 11.5 (L)  11.7 - 15.4 % Final   • Platelet Count 05/19/2023 257  150 - 450 x10E3/uL Final   •  Neutrophils 05/19/2023 55  Not Estab. % Final   • Lymphocytes 05/19/2023 32  Not Estab. % Final   • Monocytes 05/19/2023 10  Not Estab. % Final   • Eosinophils 05/19/2023 1  Not Estab. % Final   • Basophils PCT 05/19/2023 2  Not Estab. % Final   • Neutrophils (Absolute) 05/19/2023 3.1  1.4 - 7.0 x10E3/uL Final   • Lymphocytes (Absolute) 05/19/2023 1.7  0.7 - 3.1 x10E3/uL Final   • Monocytes (Absolute) 05/19/2023 0.6  0.1 - 0.9 x10E3/uL Final   • Eosinophils (Absolute) 05/19/2023 0.0  0.0 - 0.4 x10E3/uL Final   • Basophils ABS 05/19/2023 0.1  0.0 - 0.2 x10E3/uL Final   • Immature Granulocytes 05/19/2023 0  Not Estab. % Final   • Immature Granulocytes (Absolute) 05/19/2023 0.0  0.0 - 0.1 x10E3/uL Final    Comment: A hand-written panel/profile was received from your office. In  accordance with the [facility] Ambiguous Test Code Policy dated July 2003, we have assigned CBC with Differential/Platelet, Test Code  #[id number] to this request. If this is not the testing you wished to  receive on this specimen, please contact the [facility] Client Inquiry/  Technical Services Department to clarify the test order. We  appreciate your business.     • Glucose, Random 05/19/2023 51 (L)  70 - 99 mg/dL Final   • BUN 05/19/2023 21  6 - 24 mg/dL Final   • Creatinine 05/19/2023 0.60  0.57 - 1.00 mg/dL Final   • eGFR 05/19/2023 112  >59 mL/min/1.73 Final   • SL AMB BUN/CREATININE RATIO 05/19/2023 35 (H)  9 - 23 Final   • Sodium 05/19/2023 139  134 - 144 mmol/L Final   • Potassium 05/19/2023 4.1  3.5 - 5.2 mmol/L Final   • Chloride 05/19/2023 104  96 - 106 mmol/L Final   • CO2 05/19/2023 25  20 - 29 mmol/L Final   • CALCIUM 05/19/2023 9.1  8.7 - 10.2 mg/dL Final   • Protein, Total 05/19/2023 6.9  6.0 - 8.5 g/dL Final   • Albumin 05/19/2023 4.4  3.8 - 4.8 g/dL Final   • Globulin, Total 05/19/2023 2.5  1.5 - 4.5 g/dL Final   • Albumin/Globulin Ratio 05/19/2023 1.8  1.2 - 2.2 Final   • TOTAL BILIRUBIN 05/19/2023 <0.2  0.0 - 1.2 mg/dL Final   • Alk  Phos Isoenzymes 05/19/2023 52  44 - 121 IU/L Final   • AST 05/19/2023 23  0 - 40 IU/L Final   • ALT 05/19/2023 25  0 - 32 IU/L Final   • Specific Gravity 05/19/2023 1.007  1.005 - 1.030 Final   • Ph 05/19/2023 7.0  5.0 - 7.5 Final   • Color UA 05/19/2023 Yellow  Yellow Final   • Urine Appearance 05/19/2023 Clear  Clear Final   • Leukocyte Esterase 05/19/2023 Trace (A)  Negative Final   • Protein 05/19/2023 Negative  Negative/Trace Final   • Glucose, 24 HR Urine 05/19/2023 Negative  Negative Final   • Ketone, Urine 05/19/2023 Negative  Negative Final   • Blood, Urine 05/19/2023 Negative  Negative Final   • Bilirubin, Urine 05/19/2023 Negative  Negative Final   • Urobilinogen Urine 05/19/2023 0.2  0.2 - 1.0 mg/dL Final   • Nitrites Urine 05/19/2023 Negative  Negative Final   • Microscopic Examination 05/19/2023 See below:   Final    Microscopic was indicated and was performed.   • SL AMB WBC, URINE 05/19/2023 0-5  0 - 5 /hpf Final   • RBC, Urine 05/19/2023 None seen  0 - 2 /hpf Final   • Epithelial Cells (non renal) 05/19/2023 None seen  0 - 10 /hpf Final   • Casts 05/19/2023 None seen  None seen /lpf Final   • Bacteria, Urine 05/19/2023 None seen  None seen/Few Final   • Cholesterol, Total 05/19/2023 209 (H)  100 - 199 mg/dL Final   • Triglycerides 05/19/2023 104  0 - 149 mg/dL Final   • HDL 05/19/2023 66  >39 mg/dL Final   • LDL Calculated 05/19/2023 125 (H)  0 - 99 mg/dL Final   • Creatinine, Urine 05/19/2023 4.8  Not Estab. mg/dL Final   • Albumin,U,Random 05/19/2023 <3.0  Not Estab. ug/mL Final   • Microalb/Creat Ratio 05/19/2023 Comment (A)  0 - 29 mg/g creat Final    Comment: The result is below the assay's limit of quantitation which may  indicate a dilute specimen or other clinical condition. Consider  recollection at a time likely to provide a urine that is more  concentrated.                         Normal:                0 -  29                         Moderately increased: 30 - 300                          "Severely increased:       >300     • Hemoglobin A1C 05/19/2023 5.0  4.8 - 5.6 % Final    Comment:          Prediabetes: 5.7 - 6.4           Diabetes: >6.4           Glycemic control for adults with diabetes: <7.0     • 25-HYDROXY VIT D 05/19/2023 20.2 (L)  30.0 - 100.0 ng/mL Final    Comment: Vitamin D deficiency has been defined by the [place] of  Medicine and an Endocrine Society practice guideline as a  level of serum 25-OH vitamin D less than 20 ng/mL (1,2).  The Endocrine Society went on to further define vitamin D  insufficiency as a level between 21 and 29 ng/mL (2).  1. IOM ([place] of [employer]). 2010. Dietary reference     intakes for calcium and D. [place]: The     National Academies Press.  2. Alison MF, [place] ROJAS, Mairanna RUVALCABA, et al.     Evaluation, treatment, and prevention of vitamin D     deficiency: an Endocrine Society clinical practice     guideline. JCEM. 2011 Jul; 96(7):1911-30.           Patient Instructions   Pt is symptom free for this problem.  This diagnosis or problem is stable/well controlled  Patient is advised to continue same meds as outlined in medicine list          Roselyn Chacon MD        \"This note has been constructed using a voice recognition system.Therefore there may be syntax, spelling, and/or grammatical errors. Please call if you have any questions. \"  "

## 2024-06-21 NOTE — ASSESSMENT & PLAN NOTE
Symptoms controlled continue current regimen as follows in remission    Agree and continue management as follows    Cymbalta 60 mg daily in the morning    Cymbalta 30 mg daily in the evening

## 2024-09-11 ENCOUNTER — OFFICE VISIT (OUTPATIENT)
Dept: INTERNAL MEDICINE CLINIC | Facility: CLINIC | Age: 49
End: 2024-09-11
Payer: COMMERCIAL

## 2024-09-11 VITALS
DIASTOLIC BLOOD PRESSURE: 70 MMHG | BODY MASS INDEX: 21.52 KG/M2 | HEART RATE: 74 BPM | HEIGHT: 61 IN | WEIGHT: 114 LBS | OXYGEN SATURATION: 100 % | TEMPERATURE: 98 F | SYSTOLIC BLOOD PRESSURE: 98 MMHG

## 2024-09-11 DIAGNOSIS — F41.0 GENERALIZED ANXIETY DISORDER WITH PANIC ATTACKS: ICD-10-CM

## 2024-09-11 DIAGNOSIS — Z13.0 SCREENING FOR DEFICIENCY ANEMIA: ICD-10-CM

## 2024-09-11 DIAGNOSIS — U07.1 COVID-19: ICD-10-CM

## 2024-09-11 DIAGNOSIS — R11.2 NAUSEA VOMITING AND DIARRHEA: Primary | ICD-10-CM

## 2024-09-11 DIAGNOSIS — R19.7 NAUSEA VOMITING AND DIARRHEA: Primary | ICD-10-CM

## 2024-09-11 DIAGNOSIS — R50.9 FEVER AND CHILLS: ICD-10-CM

## 2024-09-11 DIAGNOSIS — R10.84 GENERALIZED ABDOMINAL PAIN: ICD-10-CM

## 2024-09-11 DIAGNOSIS — R50.81 FEVER IN OTHER DISEASES: ICD-10-CM

## 2024-09-11 DIAGNOSIS — F41.1 GENERALIZED ANXIETY DISORDER WITH PANIC ATTACKS: ICD-10-CM

## 2024-09-11 PROBLEM — R10.9 ABDOMINAL PAIN: Status: ACTIVE | Noted: 2024-09-11

## 2024-09-11 LAB
SARS-COV-2 AG UPPER RESP QL IA: NEGATIVE
SL AMB POCT RAPID FLU A: NORMAL
SL AMB POCT RAPID FLU B: NORMAL
VALID CONTROL: NORMAL

## 2024-09-11 PROCEDURE — 87804 INFLUENZA ASSAY W/OPTIC: CPT | Performed by: INTERNAL MEDICINE

## 2024-09-11 PROCEDURE — 87811 SARS-COV-2 COVID19 W/OPTIC: CPT | Performed by: INTERNAL MEDICINE

## 2024-09-11 PROCEDURE — 99214 OFFICE O/P EST MOD 30 MIN: CPT | Performed by: INTERNAL MEDICINE

## 2024-09-11 RX ORDER — ONDANSETRON 4 MG/1
4 TABLET, FILM COATED ORAL EVERY 8 HOURS PRN
Qty: 20 TABLET | Refills: 0 | Status: SHIPPED | OUTPATIENT
Start: 2024-09-11

## 2024-09-11 RX ORDER — ONDANSETRON 4 MG/1
4 TABLET, FILM COATED ORAL EVERY 8 HOURS PRN
Qty: 20 TABLET | Refills: 0 | Status: SHIPPED | OUTPATIENT
Start: 2024-09-11 | End: 2024-09-11

## 2024-09-11 NOTE — PATIENT INSTRUCTIONS
Patient Education     Diarrhea, Adult ED   General Information   You came to the Emergency Department (ED) for diarrhea. Most doctors say you have diarrhea if you have 3 or more runny or watery stools or bowel movements in a day. The doctors feel that the risk of a serious cause for your diarrhea is low.  Diarrhea that starts all of a sudden is most often caused by a virus or bacteria. This will likely get better on its own after a few days. Other things can cause you to have loose stools like:  Side effects from the medicines you take.  Problems digesting your food.  Problems with your digestive system.  You may be waiting on some test results. The staff will contact you if there are concerning results.  What care is needed at home?   Call your regular doctor to let them know you were in the ED. Make a follow-up appointment if you were told to.  Drink small amounts of fluid every 15 to 30 minutes. Good fluids to drink are water, broth, and oral electrolyte solutions. Sugar-free or very low sugar sports drinks are also OK.  Try to eat a small amount of food. Good foods to eat are potatoes, noodles, rice, oatmeal, crackers, soup, soft vegetables, and bananas. Avoid fatty foods and dairy products.  Wash your hands often. This will help keep others healthy. It is easy to spread diarrhea from one person to the next.  Stay home from school or work until you have stopped having diarrhea. Do not cook food for others while you have diarrhea.  If you were given any medicines, make sure to take them as you were told.  When do I need to get emergency help?   Return to the ED if:   You have signs of severe fluid loss, such as:  No urine for more than 8 hours.  Feel very light-headed or like you are going to pass out.  Feel weak like you are going to fall.  Your stools have a large amount (more than 1 teaspoon or 5 mL) of blood in them.  You have very bad belly pain.  When do I need to call the doctor?   You have more than 6  runny, watery stools in 24 hours.  You have a fever of 101.3°F (38.5°C) or higher or chills that do not go away after a day.  You have very bad belly pain.  Your stools have a small amount (less than 1 teaspoon or 5 mL) of blood in them.  You develop early signs of fluid loss, such as:  Your urine is very dark colored.  Your mouth is dry.  You have muscle cramps.  You have a lack of energy.  You feel light-headed when you get up.  You have new or worsening symptoms.  Last Reviewed Date   2021-05-21  Consumer Information Use and Disclaimer   This generalized information is a limited summary of diagnosis, treatment, and/or medication information. It is not meant to be comprehensive and should be used as a tool to help the user understand and/or assess potential diagnostic and treatment options. It does NOT include all information about conditions, treatments, medications, side effects, or risks that may apply to a specific patient. It is not intended to be medical advice or a substitute for the medical advice, diagnosis, or treatment of a health care provider based on the health care provider's examination and assessment of a patient’s specific and unique circumstances. Patients must speak with a health care provider for complete information about their health, medical questions, and treatment options, including any risks or benefits regarding use of medications. This information does not endorse any treatments or medications as safe, effective, or approved for treating a specific patient. [employer] and its affiliates disclaim any warranty or liability relating to this information or the use thereof. The use of this information is governed by the Terms of Use, available at https://www.woltersTrulySocialuwer.com/en/know/clinical-effectiveness-terms   Copyright   Copyright © 2024 [employer] and its affiliates and/or licensors. All rights reserved.

## 2024-09-11 NOTE — PROGRESS NOTES
Dr. Chacon's Office Visit Note  24     Crystal Dickinson 48 y.o. female MRN: [id number]  : 1975    Assessment:     1. COVID-19  -     POCT Rapid Covid Ag  -     Comprehensive metabolic panel; Future  -     Mononucleosis screen; Future  -     Comprehensive metabolic panel  -     Mononucleosis screen  -     ondansetron (ZOFRAN) 4 mg tablet; Take 1 tablet (4 mg total) by mouth every 8 (eight) hours as needed for nausea or vomiting  2. Fever in other diseases  -     POCT rapid flu A and B  -     Comprehensive metabolic panel; Future  -     Mononucleosis screen; Future  -     Comprehensive metabolic panel  -     Mononucleosis screen  -     ondansetron (ZOFRAN) 4 mg tablet; Take 1 tablet (4 mg total) by mouth every 8 (eight) hours as needed for nausea or vomiting  3. Screening for deficiency anemia  -     CBC and differential; Future  -     Comprehensive metabolic panel; Future  -     Mononucleosis screen; Future  -     CBC and differential  -     Comprehensive metabolic panel  -     Mononucleosis screen  -     ondansetron (ZOFRAN) 4 mg tablet; Take 1 tablet (4 mg total) by mouth every 8 (eight) hours as needed for nausea or vomiting  4. Nausea vomiting and diarrhea  Assessment & Plan:  Possibly due to the viral acute gastroenteritis with nausea for last 4 days much better today diarrhea for last 3 days much better today about 8-10 times or so with abdominal pain    Zofran 4 mg 3 times a day as needed    Probiotic twice a day    Instead of the diet will check CBC CMP if symptom gets worse advised to go to the emergency room  Orders:  -     Comprehensive metabolic panel; Future  -     Mononucleosis screen; Future  -     Comprehensive metabolic panel  -     Mononucleosis screen  -     ondansetron (ZOFRAN) 4 mg tablet; Take 1 tablet (4 mg total) by mouth every 8 (eight) hours as needed for nausea or vomiting  5. Fever and chills  Assessment & Plan:  Patient claims about 4 days ago one-time spiked temperature up to  101.2 which lasted about 24-hour felt nauseous with some diarrhea but no coughing congestion no chest pain no difficulty breathing tested negative for COVID and influenza    Awaiting labs for now symptomatic treatment  Orders:  -     Comprehensive metabolic panel; Future  -     Mononucleosis screen; Future  -     Comprehensive metabolic panel  -     Mononucleosis screen  -     ondansetron (ZOFRAN) 4 mg tablet; Take 1 tablet (4 mg total) by mouth every 8 (eight) hours as needed for nausea or vomiting  6. Generalized abdominal pain  Assessment & Plan:  Been having diffuse abdominal pain for last 4 days off-and-on with some cramping associate with nausea diarrhea about 7-8 times for last 3 days since this morning no bowel movement yesterday evening patient claims felt better for now no abdominal pain symptomatic treatment instructed about the diet awaiting labs overall improving    If the abdominal pain gets worse should go to the emergency room was advised  Orders:  -     Comprehensive metabolic panel; Future  -     Mononucleosis screen; Future  -     Comprehensive metabolic panel  -     Mononucleosis screen  -     ondansetron (ZOFRAN) 4 mg tablet; Take 1 tablet (4 mg total) by mouth every 8 (eight) hours as needed for nausea or vomiting        Discussion Summary and Plan:  Today's care plan and medications were reviewed with patient in detail and all their questions answered to their satisfaction.    Chief Complaint   Patient presents with    Diarrhea     Nausea, exhaustion headache since Saturday. covid negative  Saturday night  Wants lab work done.      Subjective:  Diarrhea   Associated symptoms include abdominal pain, chills and a fever. Pertinent negatives include no arthralgias, coughing, headaches, myalgias or vomiting.       The following portions of the patient's history were reviewed and updated as appropriate: allergies, current medications, past family history, past medical history, past social history,  past surgical history and problem list.    Review of Systems   Constitutional:  Positive for chills, fatigue and fever. Negative for activity change, appetite change, diaphoresis and unexpected weight change.   HENT:  Negative for congestion, dental problem, drooling, ear discharge, ear pain, facial swelling, hearing loss, mouth sores, nosebleeds, postnasal drip, rhinorrhea, sinus pressure, sneezing, sore throat, tinnitus, trouble swallowing and voice change.    Eyes:  Negative for photophobia, pain, discharge, redness, itching and visual disturbance.   Respiratory:  Negative for apnea, cough, choking, chest tightness, shortness of breath, wheezing and stridor.    Cardiovascular:  Negative for chest pain, palpitations and leg swelling.   Gastrointestinal:  Positive for abdominal pain, diarrhea and nausea. Negative for abdominal distention, anal bleeding, blood in stool, constipation, rectal pain and vomiting.   Endocrine: Negative for cold intolerance, heat intolerance, polydipsia, polyphagia and polyuria.   Genitourinary:  Negative for decreased urine volume, difficulty urinating, dysuria, enuresis, flank pain, frequency, genital sores, hematuria and urgency.   Musculoskeletal:  Negative for arthralgias, back pain, gait problem, joint swelling, myalgias, neck pain and neck stiffness.   Skin:  Negative for color change, pallor, rash and wound.   Allergic/Immunologic: Negative.  Negative for environmental allergies, food allergies and immunocompromised state.   Neurological:  Negative for dizziness, tremors, seizures, syncope, facial asymmetry, speech difficulty, weakness, light-headedness, numbness and headaches.   Psychiatric/Behavioral:  Negative for agitation, behavioral problems, confusion, decreased concentration, dysphoric mood, hallucinations, self-injury, sleep disturbance and suicidal ideas. The patient is not nervous/anxious and is not hyperactive.          Historical Information   Patient Active Problem  List   Diagnosis    Anxiety    Fall    Major depressive disorder with single episode, in full remission (HCC)    Osteopenia    Allergic rhinitis    Generalized anxiety disorder with panic attacks    Vertigo    Nausea vomiting and diarrhea    Fever and chills    Abdominal pain     Past Medical History:   Diagnosis Date    Abdominal pain     Anxiety     Dizziness     Lightheadedness     Palpitations     SOB (shortness of breath)      Past Surgical History:   Procedure Laterality Date    DXA PROCEDURE (HISTORICAL)  06/16/2020     Social History     Substance and Sexual Activity   Alcohol Use No     Social History     Substance and Sexual Activity   Drug Use No     Social History     Tobacco Use   Smoking Status Never   Smokeless Tobacco Never     Family History   Problem Relation Age of Onset    Diabetes Father     No Known Problems Brother     No Known Problems Brother     No Known Problems Son     No Known Problems Son     No Known Problems Paternal Uncle      Health Maintenance Due   Topic    Hepatitis C Screening     Cervical Cancer Screening     Colorectal Cancer Screening     Breast Cancer Screening: Mammogram     Annual Physical     COVID-19 Vaccine (1 - 2023-24 season)    Influenza Vaccine (1)    Depression Screening       Meds/Allergies       Current Outpatient Medications:     busPIRone (BUSPAR) 15 mg tablet, ONE TAB 3 TIMES A DAY, Disp: 270 tablet, Rfl: 1    DULoxetine (CYMBALTA) 30 mg delayed release capsule, ONE CAPSULE DAILY, Disp: 90 capsule, Rfl: 1    DULoxetine (CYMBALTA) 60 mg delayed release capsule, ONE CAPSULE DAILY, Disp: 90 capsule, Rfl: 1    ondansetron (ZOFRAN) 4 mg tablet, Take 1 tablet (4 mg total) by mouth every 8 (eight) hours as needed for nausea or vomiting, Disp: 20 tablet, Rfl: 0    loratadine (Claritin) 10 mg tablet, Daily (Patient not taking: Reported on 9/11/2024), Disp: , Rfl:     LORazepam (ATIVAN) 0.5 mg tablet, Take 1 tablet (0.5 mg total) by mouth daily as needed for anxiety  "(Patient not taking: Reported on 9/11/2024), Disp: 30 tablet, Rfl: 2    meclizine (ANTIVERT) 25 mg tablet, Take 1 tablet (25 mg total) by mouth 3 (three) times a day as needed for dizziness for up to 5 days (Patient not taking: Reported on 9/11/2024), Disp: 15 tablet, Rfl: 0      Objective:    Vitals:   BP 98/70   Pulse 74   Temp 98 °F (36.7 °C)   Ht 5' 1\" (1.549 m)   Wt 51.7 kg (114 lb)   LMP 07/27/2021   SpO2 100%   BMI 21.54 kg/m²   Body mass index is 21.54 kg/m².  Vitals:    09/11/24 0953   Weight: 51.7 kg (114 lb)       Physical Exam  Nursing note reviewed.   Constitutional:       General: She is not in acute distress.     Appearance: She is well-developed. She is not ill-appearing, toxic-appearing or diaphoretic.   HENT:      Head: Normocephalic and atraumatic.      Right Ear: External ear normal.      Left Ear: External ear normal.      Nose: Nose normal.      Mouth/Throat:      Pharynx: No oropharyngeal exudate.   Eyes:      General: Lids are normal. Lids are everted, no foreign bodies appreciated. No scleral icterus.        Right eye: No discharge.         Left eye: No discharge.      Conjunctiva/sclera: Conjunctivae normal.      Pupils: Pupils are equal, round, and reactive to light.   Neck:      Thyroid: No thyromegaly.      Vascular: Normal carotid pulses. No carotid bruit, hepatojugular reflux or JVD.      Trachea: No tracheal tenderness or tracheal deviation.   Cardiovascular:      Rate and Rhythm: Normal rate and regular rhythm.      Pulses: Normal pulses.      Heart sounds: Normal heart sounds. No murmur heard.     No friction rub. No gallop.   Pulmonary:      Effort: Pulmonary effort is normal. No respiratory distress.      Breath sounds: Normal breath sounds. No stridor. No wheezing or rales.   Chest:      Chest wall: No tenderness.   Abdominal:      General: Bowel sounds are normal. There is no distension.      Palpations: Abdomen is soft. There is no mass.      Tenderness: There is no " abdominal tenderness. There is no guarding or rebound.   Musculoskeletal:         General: No tenderness or deformity. Normal range of motion.      Cervical back: Normal range of motion and neck supple. No edema, erythema or rigidity. No spinous process tenderness or muscular tenderness. Normal range of motion.   Lymphadenopathy:      Head:      Right side of head: No submental, submandibular, tonsillar, preauricular or posterior auricular adenopathy.      Left side of head: No submental, submandibular, tonsillar, preauricular, posterior auricular or occipital adenopathy.      Cervical: No cervical adenopathy.      Right cervical: No superficial, deep or posterior cervical adenopathy.     Left cervical: No superficial, deep or posterior cervical adenopathy.      Upper Body:      Right upper body: No pectoral adenopathy.      Left upper body: No pectoral adenopathy.   Skin:     General: Skin is warm and dry.      Coloration: Skin is not pale.      Findings: No erythema or rash.   Neurological:      General: No focal deficit present.      Mental Status: She is alert and oriented to person, place, and time.      Cranial Nerves: No cranial nerve deficit.      Sensory: No sensory deficit.      Motor: No tremor, abnormal muscle tone or seizure activity.      Coordination: Coordination normal.      Gait: Gait normal.      Deep Tendon Reflexes: Reflexes are normal and symmetric. Reflexes normal.   Psychiatric:         Behavior: Behavior normal.         Thought Content: Thought content normal.         Judgment: Judgment normal.         Lab Review   Office Visit on 09/11/2024   Component Date Value Ref Range Status    POCT SARS-CoV-2 Ag 09/11/2024 Negative  Negative Final    VALID CONTROL 09/11/2024 Valid   Final    RAPID FLU A 09/11/2024 neg   Final    RAPID FLU B 09/11/2024 neg   Final         Patient Instructions   Patient Education     Diarrhea, Adult ED   General Information   You came to the Emergency Department (ED)  for diarrhea. Most doctors say you have diarrhea if you have 3 or more runny or watery stools or bowel movements in a day. The doctors feel that the risk of a serious cause for your diarrhea is low.  Diarrhea that starts all of a sudden is most often caused by a virus or bacteria. This will likely get better on its own after a few days. Other things can cause you to have loose stools like:  Side effects from the medicines you take.  Problems digesting your food.  Problems with your digestive system.  You may be waiting on some test results. The staff will contact you if there are concerning results.  What care is needed at home?   Call your regular doctor to let them know you were in the ED. Make a follow-up appointment if you were told to.  Drink small amounts of fluid every 15 to 30 minutes. Good fluids to drink are water, broth, and oral electrolyte solutions. Sugar-free or very low sugar sports drinks are also OK.  Try to eat a small amount of food. Good foods to eat are potatoes, noodles, rice, oatmeal, crackers, soup, soft vegetables, and bananas. Avoid fatty foods and dairy products.  Wash your hands often. This will help keep others healthy. It is easy to spread diarrhea from one person to the next.  Stay home from school or work until you have stopped having diarrhea. Do not cook food for others while you have diarrhea.  If you were given any medicines, make sure to take them as you were told.  When do I need to get emergency help?   Return to the ED if:   You have signs of severe fluid loss, such as:  No urine for more than 8 hours.  Feel very light-headed or like you are going to pass out.  Feel weak like you are going to fall.  Your stools have a large amount (more than 1 teaspoon or 5 mL) of blood in them.  You have very bad belly pain.  When do I need to call the doctor?   You have more than 6 runny, watery stools in 24 hours.  You have a fever of 101.3°F (38.5°C) or higher or chills that do not go  "away after a day.  You have very bad belly pain.  Your stools have a small amount (less than 1 teaspoon or 5 mL) of blood in them.  You develop early signs of fluid loss, such as:  Your urine is very dark colored.  Your mouth is dry.  You have muscle cramps.  You have a lack of energy.  You feel light-headed when you get up.  You have new or worsening symptoms.  Last Reviewed Date   2021-05-21  Consumer Information Use and Disclaimer   This generalized information is a limited summary of diagnosis, treatment, and/or medication information. It is not meant to be comprehensive and should be used as a tool to help the user understand and/or assess potential diagnostic and treatment options. It does NOT include all information about conditions, treatments, medications, side effects, or risks that may apply to a specific patient. It is not intended to be medical advice or a substitute for the medical advice, diagnosis, or treatment of a health care provider based on the health care provider's examination and assessment of a patient’s specific and unique circumstances. Patients must speak with a health care provider for complete information about their health, medical questions, and treatment options, including any risks or benefits regarding use of medications. This information does not endorse any treatments or medications as safe, effective, or approved for treating a specific patient. [employer] and its affiliates disclaim any warranty or liability relating to this information or the use thereof. The use of this information is governed by the Terms of Use, available at https://www.iwoca.com/en/know/clinical-effectiveness-terms   Copyright   Copyright © 2024 [employer] and its affiliates and/or licensors. All rights reserved.       Roselyn Chacon MD        \"This note has been constructed using a voice recognition system.Therefore there may be syntax, spelling, and/or grammatical errors. Please call " "if you have any questions. \"  "

## 2024-09-11 NOTE — LETTER
September 11, 2024     Patient: Crystal Dickinson  YOB: 1975  Date of Visit: 9/11/2024      To Whom it May Concern:    Crystal Dickinson is under my professional care. Crystal was seen in my office on 9/11/2024. Crystal is advised to return to work on September 16, 2024    If you have any questions or concerns, please don't hesitate to call.         Sincerely,          Roselyn Chacon MD        CC: No Recipients

## 2024-09-11 NOTE — ASSESSMENT & PLAN NOTE
Patient claims about 4 days ago one-time spiked temperature up to 101.2 which lasted about 24-hour felt nauseous with some diarrhea but no coughing congestion no chest pain no difficulty breathing tested negative for COVID and influenza    Awaiting labs for now symptomatic treatment

## 2024-09-11 NOTE — ASSESSMENT & PLAN NOTE
Possibly due to the viral acute gastroenteritis with nausea for last 4 days much better today diarrhea for last 3 days much better today about 8-10 times or so with abdominal pain    Zofran 4 mg 3 times a day as needed    Probiotic twice a day    Instead of the diet will check CBC CMP if symptom gets worse advised to go to the emergency room

## 2024-09-11 NOTE — ASSESSMENT & PLAN NOTE
Been having diffuse abdominal pain for last 4 days off-and-on with some cramping associate with nausea diarrhea about 7-8 times for last 3 days since this morning no bowel movement yesterday evening patient claims felt better for now no abdominal pain symptomatic treatment instructed about the diet awaiting labs overall improving    If the abdominal pain gets worse should go to the emergency room was advised

## 2024-09-12 DIAGNOSIS — R16.0 HEPATOMEGALY: Primary | ICD-10-CM

## 2024-09-12 DIAGNOSIS — R79.89 ABNORMAL LFTS: ICD-10-CM

## 2024-09-12 LAB
ALBUMIN SERPL-MCNC: 3.8 G/DL (ref 3.9–4.9)
ALP SERPL-CCNC: 48 IU/L (ref 44–121)
ALT SERPL-CCNC: 64 IU/L (ref 0–32)
AST SERPL-CCNC: 49 IU/L (ref 0–40)
BASOPHILS # BLD AUTO: 0 X10E3/UL (ref 0–0.2)
BASOPHILS NFR BLD AUTO: 1 %
BILIRUB SERPL-MCNC: <0.2 MG/DL (ref 0–1.2)
BUN SERPL-MCNC: 18 MG/DL (ref 6–24)
BUN/CREAT SERPL: 35 (ref 9–23)
CALCIUM SERPL-MCNC: 9 MG/DL (ref 8.7–10.2)
CHLORIDE SERPL-SCNC: 103 MMOL/L (ref 96–106)
CO2 SERPL-SCNC: 20 MMOL/L (ref 20–29)
CREAT SERPL-MCNC: 0.52 MG/DL (ref 0.57–1)
EGFR: 115 ML/MIN/1.73
EOSINOPHIL # BLD AUTO: 0.1 X10E3/UL (ref 0–0.4)
EOSINOPHIL NFR BLD AUTO: 1 %
ERYTHROCYTE [DISTWIDTH] IN BLOOD BY AUTOMATED COUNT: 11.4 % (ref 11.7–15.4)
GLOBULIN SER-MCNC: 2.3 G/DL (ref 1.5–4.5)
GLUCOSE SERPL-MCNC: 96 MG/DL (ref 70–99)
HCT VFR BLD AUTO: 38.3 % (ref 34–46.6)
HETEROPH AB SER QL LA: NEGATIVE
HGB BLD-MCNC: 12.6 G/DL (ref 11.1–15.9)
IMM GRANULOCYTES # BLD: 0 X10E3/UL (ref 0–0.1)
IMM GRANULOCYTES NFR BLD: 0 %
LYMPHOCYTES # BLD AUTO: 2 X10E3/UL (ref 0.7–3.1)
LYMPHOCYTES NFR BLD AUTO: 40 %
MCH RBC QN AUTO: 31.3 PG (ref 26.6–33)
MCHC RBC AUTO-ENTMCNC: 32.9 G/DL (ref 31.5–35.7)
MCV RBC AUTO: 95 FL (ref 79–97)
MONOCYTES # BLD AUTO: 0.7 X10E3/UL (ref 0.1–0.9)
MONOCYTES NFR BLD AUTO: 13 %
NEUTROPHILS # BLD AUTO: 2.2 X10E3/UL (ref 1.4–7)
NEUTROPHILS NFR BLD AUTO: 45 %
PLATELET # BLD AUTO: 236 X10E3/UL (ref 150–450)
POTASSIUM SERPL-SCNC: 4.2 MMOL/L (ref 3.5–5.2)
PROT SERPL-MCNC: 6.1 G/DL (ref 6–8.5)
RBC # BLD AUTO: 4.02 X10E6/UL (ref 3.77–5.28)
SODIUM SERPL-SCNC: 139 MMOL/L (ref 134–144)
WBC # BLD AUTO: 5 X10E3/UL (ref 3.4–10.8)

## 2024-09-19 ENCOUNTER — HOSPITAL ENCOUNTER (OUTPATIENT)
Dept: RADIOLOGY | Facility: HOSPITAL | Age: 49
Discharge: HOME | End: 2024-09-19
Payer: COMMERCIAL

## 2024-09-19 DIAGNOSIS — R16.0 HEPATOMEGALY: ICD-10-CM

## 2024-09-19 DIAGNOSIS — R79.89 ABNORMAL LFTS: ICD-10-CM

## 2024-09-19 PROCEDURE — 76705 ECHO EXAM OF ABDOMEN: CPT

## 2024-09-23 DIAGNOSIS — R16.0 LIVER MASS, LEFT LOBE: Primary | ICD-10-CM

## 2024-09-23 NOTE — PROGRESS NOTES
As result of the ultrasound abdomen awaiting repeat CMP and the recommended MRI abdomen as requested the order is in place

## 2024-09-29 LAB
ALBUMIN SERPL-MCNC: 4.2 G/DL (ref 3.9–4.9)
ALP SERPL-CCNC: 61 IU/L (ref 44–121)
ALT SERPL-CCNC: 61 IU/L (ref 0–32)
AST SERPL-CCNC: 28 IU/L (ref 0–40)
BILIRUB SERPL-MCNC: 0.3 MG/DL (ref 0–1.2)
BUN SERPL-MCNC: 21 MG/DL (ref 6–24)
BUN/CREAT SERPL: 36 (ref 9–23)
CALCIUM SERPL-MCNC: 9 MG/DL (ref 8.7–10.2)
CHLORIDE SERPL-SCNC: 104 MMOL/L (ref 96–106)
CO2 SERPL-SCNC: 21 MMOL/L (ref 20–29)
CREAT SERPL-MCNC: 0.59 MG/DL (ref 0.57–1)
EGFR: 112 ML/MIN/1.73
GLOBULIN SER-MCNC: 2.5 G/DL (ref 1.5–4.5)
GLUCOSE SERPL-MCNC: 89 MG/DL (ref 70–99)
HAV IGM SERPL QL IA: NEGATIVE
HBV CORE IGM SERPL QL IA: NEGATIVE
HBV SURFACE AG SERPL QL IA: NEGATIVE
HCV AB S/CO SERPL IA: NON REACTIVE
POTASSIUM SERPL-SCNC: 4.5 MMOL/L (ref 3.5–5.2)
PROT SERPL-MCNC: 6.7 G/DL (ref 6–8.5)
SL AMB INTERPRETATION: NORMAL
SODIUM SERPL-SCNC: 139 MMOL/L (ref 134–144)

## 2024-10-11 PROBLEM — R50.9 FEVER AND CHILLS: Status: RESOLVED | Noted: 2024-09-11 | Resolved: 2024-10-11

## 2024-10-11 PROBLEM — R19.7 NAUSEA VOMITING AND DIARRHEA: Status: RESOLVED | Noted: 2024-09-11 | Resolved: 2024-10-11

## 2024-10-11 PROBLEM — R11.2 NAUSEA VOMITING AND DIARRHEA: Status: RESOLVED | Noted: 2024-09-11 | Resolved: 2024-10-11

## 2024-10-31 ENCOUNTER — HOSPITAL ENCOUNTER (OUTPATIENT)
Dept: RADIOLOGY | Facility: HOSPITAL | Age: 49
Discharge: HOME | End: 2024-10-31
Attending: INTERNAL MEDICINE
Payer: COMMERCIAL

## 2024-10-31 ENCOUNTER — OFFICE VISIT (OUTPATIENT)
Dept: GASTROENTEROLOGY | Facility: CLINIC | Age: 49
End: 2024-10-31
Payer: COMMERCIAL

## 2024-10-31 VITALS
SYSTOLIC BLOOD PRESSURE: 103 MMHG | DIASTOLIC BLOOD PRESSURE: 68 MMHG | BODY MASS INDEX: 21.71 KG/M2 | HEIGHT: 61 IN | HEART RATE: 81 BPM | WEIGHT: 115 LBS

## 2024-10-31 DIAGNOSIS — Z12.11 SCREENING FOR COLON CANCER: ICD-10-CM

## 2024-10-31 DIAGNOSIS — R16.0 LIVER MASS, LEFT LOBE: ICD-10-CM

## 2024-10-31 PROCEDURE — A9581 GADOXETATE DISODIUM INJ: HCPCS | Performed by: INTERNAL MEDICINE

## 2024-10-31 PROCEDURE — 74183 MRI ABD W/O CNTR FLWD CNTR: CPT

## 2024-10-31 PROCEDURE — 99203 OFFICE O/P NEW LOW 30 MIN: CPT | Performed by: NURSE PRACTITIONER

## 2024-10-31 RX ADMIN — GADOXETATE DISODIUM 10 ML: 181.43 INJECTION, SOLUTION INTRAVENOUS at 09:17

## 2024-10-31 NOTE — ASSESSMENT & PLAN NOTE
Patient is age 49 and is due for colonoscopy for colon cancer screening.  Patient denies any GI issues.  Orders:    Ambulatory Referral to Gastroenterology    Colonoscopy; Future  -MiraLAX and Dulcolax bowel prep  -MiraLAX 1 capful daily in 8 ounces noncarbonated beverage for 5 days prior to procedure to facilitate bowel cleansing

## 2024-10-31 NOTE — PATIENT INSTRUCTIONS
Scheduled date of colonoscopy (as of today):12/5/24  Physician performing colonoscopy:Dr. Haywood  Location of colonoscopy:[facility]  Bowel prep reviewed with patient:Adelso Mariano  Instructions reviewed with patient by:justin  Clearances: n/a   MiraLAX and Dulcolax bowel prep  MiraLAX 1 capful in 8 ounces noncarbonated beverage for 5 days prior to procedure to facilitate bowel cleansing

## 2024-10-31 NOTE — PROGRESS NOTES
Ambulatory Visit  Name: Crystal Dickinson      : 1975      MRN: [id number]  Encounter Provider: MARIBELL Iqbal  Encounter Date: 10/31/2024   Encounter department: [facility] GASTROENTEROLOGY [address]    Assessment & Plan  Screening for colon cancer  Patient is age 49 and is due for colonoscopy for colon cancer screening.  Patient denies any GI issues.  Orders:    Ambulatory Referral to Gastroenterology    Colonoscopy; Future  -MiraLAX and Dulcolax bowel prep  -MiraLAX 1 capful daily in 8 ounces noncarbonated beverage for 5 days prior to procedure to facilitate bowel cleansing    History of Present Illness     Crystal Dickinson is a 49 y.o. female with past medical history of anxiety, depression, allergic rhinitis, vertigo who presents to office as consult for colonoscopy.  Patient denies any GI issues.  Patient denies nausea, vomiting, acid reflux, heartburn, epigastric or abdominal pain.  Patient denies blood in stool, blood from rectal area, or black tarry stool.  Bowel patterns are regular.  Abdomen exam benign no abdominal tenderness or guarding.    Patient does not smoke.  Patient drinks alcohol socially.  Patient denies illicit drug use or marijuana use.  No family history of gastric or colon cancer.  No previous EGD or colonoscopy.  Patient is not on any blood thinners or antiplatelet medication      Review of Systems   Constitutional:  Negative for chills and fever.   HENT:  Negative for ear pain and sore throat.    Eyes:  Negative for pain and visual disturbance.   Respiratory:  Negative for cough and shortness of breath.    Cardiovascular:  Negative for chest pain and palpitations.   Gastrointestinal:  Negative for abdominal pain and vomiting.   Genitourinary:  Negative for dysuria and hematuria.   Musculoskeletal:  Negative for arthralgias and back pain.   Skin:  Negative for color change and rash.   Neurological:  Negative for seizures and syncope.   All other systems  reviewed and are negative.    Medical History Reviewed by provider this encounter:  Tobacco  Allergies  Meds  Problems  Med Hx  Surg Hx  Fam Hx       Past Medical History   Past Medical History:   Diagnosis Date    Abdominal pain     Anxiety     Dizziness     Lightheadedness     Palpitations     SOB (shortness of breath)      Past Surgical History:   Procedure Laterality Date    DXA PROCEDURE (HISTORICAL)  06/16/2020     Family History   Problem Relation Age of Onset    Diabetes Father     No Known Problems Brother     No Known Problems Brother     No Known Problems Son     No Known Problems Son     No Known Problems Paternal Uncle      Current Outpatient Medications on File Prior to Visit   Medication Sig Dispense Refill    busPIRone (BUSPAR) 15 mg tablet ONE TAB 3 TIMES A  tablet 1    DULoxetine (CYMBALTA) 30 mg delayed release capsule ONE CAPSULE DAILY 90 capsule 1    DULoxetine (CYMBALTA) 60 mg delayed release capsule ONE CAPSULE DAILY 90 capsule 1    LORazepam (ATIVAN) 0.5 mg tablet Take 1 tablet (0.5 mg total) by mouth daily as needed for anxiety (Patient not taking: Reported on 9/11/2024) 30 tablet 2    ondansetron (ZOFRAN) 4 mg tablet Take 1 tablet (4 mg total) by mouth every 8 (eight) hours as needed for nausea or vomiting (Patient not taking: Reported on 10/31/2024) 20 tablet 0     No current facility-administered medications on file prior to visit.   No Known Allergies   Current Outpatient Medications on File Prior to Visit   Medication Sig Dispense Refill    busPIRone (BUSPAR) 15 mg tablet ONE TAB 3 TIMES A  tablet 1    DULoxetine (CYMBALTA) 30 mg delayed release capsule ONE CAPSULE DAILY 90 capsule 1    DULoxetine (CYMBALTA) 60 mg delayed release capsule ONE CAPSULE DAILY 90 capsule 1    LORazepam (ATIVAN) 0.5 mg tablet Take 1 tablet (0.5 mg total) by mouth daily as needed for anxiety (Patient not taking: Reported on 9/11/2024) 30 tablet 2    ondansetron (ZOFRAN) 4 mg tablet Take 1  "tablet (4 mg total) by mouth every 8 (eight) hours as needed for nausea or vomiting (Patient not taking: Reported on 10/31/2024) 20 tablet 0     No current facility-administered medications on file prior to visit.      Social History     Tobacco Use    Smoking status: Never    Smokeless tobacco: Never   Vaping Use    Vaping status: Never Used   Substance and Sexual Activity    Alcohol use: No    Drug use: No    Sexual activity: Not on file         Objective     /68   Pulse 81   Ht 5' 1\" (1.549 m)   Wt 52.2 kg (115 lb)   LMP 07/27/2021   BMI 21.73 kg/m²     Physical Exam  Vitals and nursing note reviewed.   Constitutional:       General: She is not in acute distress.     Appearance: She is well-developed.   HENT:      Head: Normocephalic and atraumatic.   Eyes:      Conjunctiva/sclera: Conjunctivae normal.   Cardiovascular:      Rate and Rhythm: Normal rate and regular rhythm.      Pulses: Normal pulses.      Heart sounds: Normal heart sounds. No murmur heard.  Pulmonary:      Effort: Pulmonary effort is normal. No respiratory distress.      Breath sounds: Normal breath sounds. No stridor. No wheezing, rhonchi or rales.   Abdominal:      General: Bowel sounds are normal. There is no distension.      Palpations: Abdomen is soft. There is no mass.      Tenderness: There is no abdominal tenderness. There is no guarding or rebound.   Musculoskeletal:         General: No swelling.      Cervical back: Neck supple.      Right lower leg: No edema.      Left lower leg: No edema.   Skin:     General: Skin is warm and dry.      Capillary Refill: Capillary refill takes less than 2 seconds.      Coloration: Skin is not jaundiced or pale.   Neurological:      Mental Status: She is alert and oriented to person, place, and time.   Psychiatric:         Mood and Affect: Mood normal.       Answers submitted by the patient for this visit:  Abdominal Pain Questionnaire (Submitted on 10/31/2024)  Chief Complaint: Abdominal " pain  Progression since onset: resolved  anorexia: No  belching: No  flatus: No  hematochezia: No  melena: No  weight loss: No

## 2024-11-19 DIAGNOSIS — F32.5 MAJOR DEPRESSIVE DISORDER WITH SINGLE EPISODE, IN FULL REMISSION (HCC): ICD-10-CM

## 2024-11-20 ENCOUNTER — ANESTHESIA (OUTPATIENT)
Dept: ANESTHESIOLOGY | Facility: HOSPITAL | Age: 49
End: 2024-11-20

## 2024-11-20 ENCOUNTER — ANESTHESIA EVENT (OUTPATIENT)
Dept: ANESTHESIOLOGY | Facility: HOSPITAL | Age: 49
End: 2024-11-20

## 2024-11-20 ENCOUNTER — OFFICE VISIT (OUTPATIENT)
Dept: INTERNAL MEDICINE CLINIC | Facility: CLINIC | Age: 49
End: 2024-11-20
Payer: COMMERCIAL

## 2024-11-20 VITALS
BODY MASS INDEX: 21.71 KG/M2 | HEART RATE: 75 BPM | SYSTOLIC BLOOD PRESSURE: 102 MMHG | TEMPERATURE: 98.7 F | DIASTOLIC BLOOD PRESSURE: 58 MMHG | OXYGEN SATURATION: 100 % | WEIGHT: 115 LBS | RESPIRATION RATE: 15 BRPM | HEIGHT: 61 IN

## 2024-11-20 DIAGNOSIS — Z12.31 ENCOUNTER FOR SCREENING MAMMOGRAM FOR BREAST CANCER: ICD-10-CM

## 2024-11-20 DIAGNOSIS — R79.89 ABNORMAL LFTS: ICD-10-CM

## 2024-11-20 DIAGNOSIS — R10.84 GENERALIZED ABDOMINAL PAIN: Primary | ICD-10-CM

## 2024-11-20 DIAGNOSIS — R42 VERTIGO: ICD-10-CM

## 2024-11-20 PROCEDURE — 99213 OFFICE O/P EST LOW 20 MIN: CPT | Performed by: INTERNAL MEDICINE

## 2024-11-20 RX ORDER — DULOXETIN HYDROCHLORIDE 30 MG/1
CAPSULE, DELAYED RELEASE ORAL
Qty: 90 CAPSULE | Refills: 1 | Status: SHIPPED | OUTPATIENT
Start: 2024-11-20

## 2024-11-20 NOTE — ASSESSMENT & PLAN NOTE
Lab Results   Component Value Date    SODIUM 139 09/28/2024    K 4.5 09/28/2024     09/28/2024    CO2 21 09/28/2024    AGAP 7 03/06/2018    BUN 21 09/28/2024    CREATININE 0.59 09/28/2024    GLUC 89 09/28/2024    CALCIUM 8.7 03/06/2018    AST 28 09/28/2024    ALT 61 (H) 09/28/2024    TP 6.7 09/28/2024    TBILI 0.3 09/28/2024    EGFR 112 09/28/2024   Above reviewed LFT much improved hepatitis profile negative asymptomatic will monitor closely awaiting colonoscopy

## 2024-11-20 NOTE — PROGRESS NOTES
Dr. Chacon's Office Visit Note  24     Crystal Dickinson 49 y.o. female MRN: [id number]  : 1975    Assessment:     1. Encounter for screening mammogram for breast cancer  -     Mammo screening bilateral w 3d and cad; Future; Expected date: 2024  2. Generalized abdominal pain  Assessment & Plan:  Now resolved asymptomatic LFT improved CMP reviewed will monitor closely    Awaiting to be seen by GI and awaiting colonoscopy  3. Vertigo  Assessment & Plan:  Now has resolved no symptoms  4. Abnormal LFTs  Assessment & Plan:  Lab Results   Component Value Date    SODIUM 139 2024    K 4.5 2024     2024    CO2 21 2024    AGAP 7 2018    BUN 21 2024    CREATININE 0.59 2024    GLUC 89 2024    CALCIUM 8.7 2018    AST 28 2024    ALT 61 (H) 2024    TP 6.7 2024    TBILI 0.3 2024    EGFR 112 2024   Above reviewed LFT much improved hepatitis profile negative asymptomatic will monitor closely awaiting colonoscopy        Discussion Summary and Plan:  Today's care plan and medications were reviewed with patient in detail and all their questions answered to their satisfaction.    Chief Complaint   Patient presents with    Follow-up      Subjective:  In follow-up for the MRI and blood work patient denies any no nausea vomiting chest pain difficulty breathing abdominal pain awaiting colonoscopy        The following portions of the patient's history were reviewed and updated as appropriate: allergies, current medications, past family history, past medical history, past social history, past surgical history and problem list.    Review of Systems   Constitutional:  Positive for activity change. Negative for appetite change, chills, diaphoresis, fatigue, fever and unexpected weight change.   HENT:  Negative for congestion, dental problem, drooling, ear discharge, ear pain, facial swelling, hearing loss, mouth sores, nosebleeds, postnasal  drip, rhinorrhea, sinus pressure, sneezing, sore throat, tinnitus, trouble swallowing and voice change.    Eyes:  Negative for photophobia, pain, discharge, redness, itching and visual disturbance.   Respiratory:  Negative for apnea, cough, choking, chest tightness, shortness of breath, wheezing and stridor.    Cardiovascular:  Negative for chest pain, palpitations and leg swelling.   Gastrointestinal:  Negative for abdominal distention, abdominal pain, anal bleeding, blood in stool, constipation, diarrhea, nausea, rectal pain and vomiting.   Endocrine: Negative for cold intolerance, heat intolerance, polydipsia, polyphagia and polyuria.   Genitourinary:  Negative for decreased urine volume, difficulty urinating, dysuria, enuresis, flank pain, frequency, genital sores, hematuria and urgency.   Musculoskeletal:  Negative for arthralgias, back pain, gait problem, joint swelling, myalgias, neck pain and neck stiffness.   Skin:  Negative for color change, pallor, rash and wound.   Allergic/Immunologic: Negative.  Negative for environmental allergies, food allergies and immunocompromised state.   Neurological:  Negative for dizziness, tremors, seizures, syncope, facial asymmetry, speech difficulty, weakness, light-headedness, numbness and headaches.   Psychiatric/Behavioral:  Negative for agitation, behavioral problems, confusion, decreased concentration, dysphoric mood, hallucinations, self-injury, sleep disturbance and suicidal ideas. The patient is nervous/anxious. The patient is not hyperactive.          Historical Information   Patient Active Problem List   Diagnosis    Anxiety    Fall    Major depressive disorder with single episode, in full remission (HCC)    Osteopenia    Allergic rhinitis    Generalized anxiety disorder with panic attacks    Vertigo    Abdominal pain    Screening for colon cancer    Abnormal LFTs     Past Medical History:   Diagnosis Date    Abdominal pain     Anxiety     Dizziness      "Lightheadedness     Palpitations     SOB (shortness of breath)      Past Surgical History:   Procedure Laterality Date    DXA PROCEDURE (HISTORICAL)  06/16/2020     Social History     Substance and Sexual Activity   Alcohol Use No     Social History     Substance and Sexual Activity   Drug Use No     Social History     Tobacco Use   Smoking Status Never   Smokeless Tobacco Never     Family History   Problem Relation Age of Onset    Diabetes Father     No Known Problems Brother     No Known Problems Brother     No Known Problems Son     No Known Problems Son     No Known Problems Paternal Uncle      Health Maintenance Due   Topic    Cervical Cancer Screening     Colorectal Cancer Screening     Breast Cancer Screening: Mammogram     Annual Physical     Influenza Vaccine (1)    COVID-19 Vaccine (1 - 2024-25 season)    Depression Screening       Meds/Allergies       Current Outpatient Medications:     busPIRone (BUSPAR) 15 mg tablet, ONE TAB 3 TIMES A DAY, Disp: 270 tablet, Rfl: 1    DULoxetine (CYMBALTA) 30 mg delayed release capsule, ONE CAPSULE DAILY, Disp: 90 capsule, Rfl: 1    DULoxetine (CYMBALTA) 60 mg delayed release capsule, ONE CAPSULE DAILY, Disp: 90 capsule, Rfl: 1      Objective:    Vitals:   /58   Pulse 75   Temp 98.7 °F (37.1 °C)   Resp 15   Ht 5' 1\" (1.549 m)   Wt 52.2 kg (115 lb)   LMP 07/27/2021   SpO2 100%   BMI 21.73 kg/m²   Body mass index is 21.73 kg/m².  Vitals:    11/20/24 0800   Weight: 52.2 kg (115 lb)       Physical Exam  Vitals and nursing note reviewed.   Constitutional:       General: She is not in acute distress.     Appearance: She is well-developed. She is not ill-appearing, toxic-appearing or diaphoretic.   HENT:      Head: Normocephalic and atraumatic.      Right Ear: External ear normal.      Left Ear: External ear normal.      Nose: Nose normal.      Mouth/Throat:      Pharynx: No oropharyngeal exudate.   Eyes:      General: Lids are normal. Lids are everted, no foreign " bodies appreciated. No scleral icterus.        Right eye: No discharge.         Left eye: No discharge.      Conjunctiva/sclera: Conjunctivae normal.      Pupils: Pupils are equal, round, and reactive to light.   Neck:      Thyroid: No thyromegaly.      Vascular: Normal carotid pulses. No carotid bruit, hepatojugular reflux or JVD.      Trachea: No tracheal tenderness or tracheal deviation.   Cardiovascular:      Rate and Rhythm: Normal rate and regular rhythm.      Pulses: Normal pulses.      Heart sounds: Normal heart sounds. No murmur heard.     No friction rub. No gallop.   Pulmonary:      Effort: Pulmonary effort is normal. No respiratory distress.      Breath sounds: Normal breath sounds. No stridor. No wheezing or rales.   Chest:      Chest wall: No tenderness.   Abdominal:      General: Bowel sounds are normal. There is no distension.      Palpations: Abdomen is soft. There is no mass.      Tenderness: There is no abdominal tenderness. There is no guarding or rebound.   Musculoskeletal:         General: No tenderness or deformity. Normal range of motion.      Cervical back: Normal range of motion and neck supple. No edema, erythema or rigidity. No spinous process tenderness or muscular tenderness. Normal range of motion.   Lymphadenopathy:      Head:      Right side of head: No submental, submandibular, tonsillar, preauricular or posterior auricular adenopathy.      Left side of head: No submental, submandibular, tonsillar, preauricular, posterior auricular or occipital adenopathy.      Cervical: No cervical adenopathy.      Right cervical: No superficial, deep or posterior cervical adenopathy.     Left cervical: No superficial, deep or posterior cervical adenopathy.      Upper Body:      Right upper body: No pectoral adenopathy.      Left upper body: No pectoral adenopathy.   Skin:     General: Skin is warm and dry.      Coloration: Skin is not pale.      Findings: No erythema or rash.   Neurological:       "General: No focal deficit present.      Mental Status: She is alert and oriented to person, place, and time.      Cranial Nerves: No cranial nerve deficit.      Sensory: No sensory deficit.      Motor: No tremor, abnormal muscle tone or seizure activity.      Coordination: Coordination normal.      Gait: Gait normal.      Deep Tendon Reflexes: Reflexes are normal and symmetric. Reflexes normal.   Psychiatric:         Behavior: Behavior normal.         Thought Content: Thought content normal.         Judgment: Judgment normal.         Lab Review   Orders Only on 09/28/2024   Component Date Value Ref Range Status    Hep A Ab, IgM 09/28/2024 Negative  Negative Final    Comment: A negative anti-HAV IgM result suggests no recent or current  HAV infection.      HBsAg Screen 09/28/2024 Negative  Negative Final    Hep B Core Ab, IgM 09/28/2024 Negative  Negative Final    HEP C AB 09/28/2024 Non Reactive  Non Reactive Final    Interpretation: 09/28/2024 Comment   Final    Comment: Not infected with HCV unless early or acute infection is  suspected (which may be delayed in an immunocompromised  individual), or other evidence exists to indicate HCV infection.           Patient Instructions   Pt is symptom free for this problem.  This diagnosis or problem is stable/well controlled  Patient is advised to continue same meds as outlined in medicine list          Roselyn Chacon MD        \"This note has been constructed using a voice recognition system.Therefore there may be syntax, spelling, and/or grammatical errors. Please call if you have any questions. \"  "

## 2024-11-20 NOTE — ASSESSMENT & PLAN NOTE
Now resolved asymptomatic LFT improved CMP reviewed will monitor closely    Awaiting to be seen by GI and awaiting colonoscopy

## 2024-11-26 ENCOUNTER — TELEPHONE (OUTPATIENT)
Dept: GASTROENTEROLOGY | Facility: AMBULARY SURGERY CENTER | Age: 49
End: 2024-11-26

## 2024-11-26 NOTE — TELEPHONE ENCOUNTER
Spoke to pt confirming pt's colonoscopy scheduled on 12/5/24 at [facility] with DR Haywood.  Informed [facility] would be calling the day prior with the arrival time.  Pt has instructions and is aware needs a .

## 2024-11-30 PROBLEM — Z12.11 SCREENING FOR COLON CANCER: Status: RESOLVED | Noted: 2024-10-31 | Resolved: 2024-11-30

## 2024-12-05 ENCOUNTER — HOSPITAL ENCOUNTER (OUTPATIENT)
Dept: GASTROENTEROLOGY | Facility: AMBULARY SURGERY CENTER | Age: 49
Setting detail: OUTPATIENT SURGERY
End: 2024-12-05
Attending: INTERNAL MEDICINE
Payer: COMMERCIAL

## 2024-12-05 ENCOUNTER — ANESTHESIA (OUTPATIENT)
Dept: GASTROENTEROLOGY | Facility: AMBULARY SURGERY CENTER | Age: 49
End: 2024-12-05
Payer: COMMERCIAL

## 2024-12-05 VITALS
DIASTOLIC BLOOD PRESSURE: 62 MMHG | RESPIRATION RATE: 18 BRPM | HEART RATE: 73 BPM | BODY MASS INDEX: 21.71 KG/M2 | OXYGEN SATURATION: 99 % | SYSTOLIC BLOOD PRESSURE: 100 MMHG | TEMPERATURE: 97.7 F | WEIGHT: 115 LBS | HEIGHT: 61 IN

## 2024-12-05 DIAGNOSIS — Z12.11 SCREENING FOR COLON CANCER: ICD-10-CM

## 2024-12-05 PROCEDURE — 88342 IMHCHEM/IMCYTCHM 1ST ANTB: CPT | Performed by: SPECIALIST

## 2024-12-05 PROCEDURE — 45385 COLONOSCOPY W/LESION REMOVAL: CPT | Performed by: INTERNAL MEDICINE

## 2024-12-05 PROCEDURE — 88305 TISSUE EXAM BY PATHOLOGIST: CPT | Performed by: SPECIALIST

## 2024-12-05 PROCEDURE — 45380 COLONOSCOPY AND BIOPSY: CPT | Performed by: INTERNAL MEDICINE

## 2024-12-05 PROCEDURE — 88341 IMHCHEM/IMCYTCHM EA ADD ANTB: CPT | Performed by: SPECIALIST

## 2024-12-05 RX ORDER — SODIUM CHLORIDE, SODIUM LACTATE, POTASSIUM CHLORIDE, CALCIUM CHLORIDE 600; 310; 30; 20 MG/100ML; MG/100ML; MG/100ML; MG/100ML
125 INJECTION, SOLUTION INTRAVENOUS CONTINUOUS
Status: DISCONTINUED | OUTPATIENT
Start: 2024-12-05 | End: 2024-12-09 | Stop reason: HOSPADM

## 2024-12-05 RX ORDER — LIDOCAINE HYDROCHLORIDE 10 MG/ML
INJECTION, SOLUTION EPIDURAL; INFILTRATION; INTRACAUDAL; PERINEURAL AS NEEDED
Status: DISCONTINUED | OUTPATIENT
Start: 2024-12-05 | End: 2024-12-05

## 2024-12-05 RX ORDER — PROPOFOL 10 MG/ML
INJECTION, EMULSION INTRAVENOUS CONTINUOUS PRN
Status: DISCONTINUED | OUTPATIENT
Start: 2024-12-05 | End: 2024-12-05

## 2024-12-05 RX ORDER — PROPOFOL 10 MG/ML
INJECTION, EMULSION INTRAVENOUS AS NEEDED
Status: DISCONTINUED | OUTPATIENT
Start: 2024-12-05 | End: 2024-12-05

## 2024-12-05 RX ADMIN — PROPOFOL 100 MG: 10 INJECTION, EMULSION INTRAVENOUS at 08:04

## 2024-12-05 RX ADMIN — SODIUM CHLORIDE, SODIUM LACTATE, POTASSIUM CHLORIDE, AND CALCIUM CHLORIDE: .6; .31; .03; .02 INJECTION, SOLUTION INTRAVENOUS at 07:55

## 2024-12-05 RX ADMIN — LIDOCAINE HYDROCHLORIDE 50 MG: 10 INJECTION, SOLUTION EPIDURAL; INFILTRATION; INTRACAUDAL; PERINEURAL at 08:04

## 2024-12-05 RX ADMIN — PROPOFOL 120 MCG/KG/MIN: 10 INJECTION, EMULSION INTRAVENOUS at 08:04

## 2024-12-05 NOTE — ANESTHESIA POSTPROCEDURE EVALUATION
Post-Op Assessment Note            No anethesia notable event occurred.    Staff: Anesthesiologist           Last Filed PACU Vitals:  Vitals Value Taken Time   Temp     Pulse 73 12/05/24 0849   /62 12/05/24 0849   Resp 18 12/05/24 0849   SpO2 99 % 12/05/24 0849       Modified Kenia:  Activity: 2 (12/5/2024  8:50 AM)  Respiration: 2 (12/5/2024  8:50 AM)  Circulation: 2 (12/5/2024  8:50 AM)  Consciousness: 2 (12/5/2024  8:50 AM)  Oxygen Saturation: 2 (12/5/2024  8:50 AM)  Modified Kenia Score: 10 (12/5/2024  8:50 AM)

## 2024-12-05 NOTE — H&P
"History and Physical -  [facility]  Crystal Dickinson 49 y.o. female MRN: [id number]                  HPI: Crystal Dickinson is a 49 y.o. year old female who presents for screening colonoscopy screening      REVIEW OF SYSTEMS: Per the HPI, and otherwise unremarkable.    Historical Information   Past Medical History:   Diagnosis Date    Abdominal pain     Anxiety     Dizziness     Lightheadedness     Palpitations     SOB (shortness of breath)      Past Surgical History:   Procedure Laterality Date    DXA PROCEDURE (HISTORICAL)  06/16/2020     Social History   Social History     Substance and Sexual Activity   Alcohol Use No     Social History     Substance and Sexual Activity   Drug Use No     Social History     Tobacco Use   Smoking Status Never   Smokeless Tobacco Never     Family History   Problem Relation Age of Onset    Diabetes Father     No Known Problems Brother     No Known Problems Brother     No Known Problems Son     No Known Problems Son     No Known Problems Paternal Uncle        Meds/Allergies       Current Outpatient Medications:     busPIRone (BUSPAR) 15 mg tablet    DULoxetine (CYMBALTA) 30 mg delayed release capsule    DULoxetine (CYMBALTA) 60 mg delayed release capsule    Current Facility-Administered Medications:     lactated ringers infusion, 125 mL/hr, Intravenous, Continuous, Stopped at 12/05/24 0724    No Known Allergies    Objective     /61   Pulse 74   Temp 97.7 °F (36.5 °C)   Resp 16   Ht 5' 1\" (1.549 m)   Wt 52.2 kg (115 lb)   LMP 07/27/2021   SpO2 100%   BMI 21.73 kg/m²       PHYSICAL EXAM    Gen: NAD  Head: NCAT  CV: RRR  CHEST: Clear  ABD: soft, NT/ND  EXT: no edema      ASSESSMENT/PLAN:  This is a 49 y.o. year old female here for colonoscopy, and she is stable and optimized for her procedure.        "

## 2024-12-05 NOTE — ANESTHESIA POSTPROCEDURE EVALUATION
Post-Op Assessment Note    CV Status:  Stable    Pain management: adequate       Mental Status:  Alert and awake   Hydration Status:  Euvolemic   PONV Controlled:  Controlled   Airway Patency:  Patent     Post Op Vitals Reviewed: Yes              Last Filed PACU Vitals:  Vitals Value Taken Time   Temp     Pulse 82    BP 91/61    Resp 20    SpO2 100        Modified Kenia:  Activity: 2 (12/5/2024  7:16 AM)  Respiration: 2 (12/5/2024  7:16 AM)  Consciousness: 2 (12/5/2024  7:16 AM)  Oxygen Saturation: 2 (12/5/2024  7:16 AM)

## 2024-12-05 NOTE — ANESTHESIA PREPROCEDURE EVALUATION
Procedure:  COLONOSCOPY    Relevant Problems   NEURO/PSYCH   (+) Anxiety   (+) Generalized anxiety disorder with panic attacks   (+) Major depressive disorder with single episode, in full remission (HCC)        Physical Exam    Airway    Mallampati score: II  TM Distance: >3 FB  Neck ROM: full     Dental   No notable dental hx     Cardiovascular  Cardiovascular exam normal    Pulmonary  Pulmonary exam normal     Other Findings  post-pubertal.      Anesthesia Plan  ASA Score- 2     Anesthesia Type- IV sedation with anesthesia with ASA Monitors.         Additional Monitors:     Airway Plan:            Plan Factors-Exercise tolerance (METS): >4 METS.    Chart reviewed.   Existing labs reviewed. Patient summary reviewed.    Patient is not a current smoker.              Induction-     Postoperative Plan-     Perioperative Resuscitation Plan - Level 1 - Full Code.       Informed Consent- Anesthetic plan and risks discussed with patient.  I personally reviewed this patient with the CRNA. Discussed and agreed on the Anesthesia Plan with the CRNA..

## 2024-12-10 PROCEDURE — 88341 IMHCHEM/IMCYTCHM EA ADD ANTB: CPT | Performed by: SPECIALIST

## 2024-12-10 PROCEDURE — 88342 IMHCHEM/IMCYTCHM 1ST ANTB: CPT | Performed by: SPECIALIST

## 2024-12-10 PROCEDURE — 88305 TISSUE EXAM BY PATHOLOGIST: CPT | Performed by: SPECIALIST

## 2024-12-13 ENCOUNTER — RESULTS FOLLOW-UP (OUTPATIENT)
Dept: GASTROENTEROLOGY | Facility: CLINIC | Age: 49
End: 2024-12-13

## 2024-12-13 DIAGNOSIS — D09.9 ADENOCARCINOMA IN SITU IN TUBULOVILLOUS ADENOMA: Primary | ICD-10-CM

## 2024-12-13 NOTE — RESULT ENCOUNTER NOTE
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  I did put in a consult for colorectal surgeon Dr. Guevara to get his opinion regarding conservative approach with a follow-up colonoscopy in 6 months versus surgical intervention.  Patient aware.  Follow up in my office as needed

## 2024-12-18 ENCOUNTER — TELEPHONE (OUTPATIENT)
Age: 49
End: 2024-12-18

## 2025-01-03 NOTE — PROGRESS NOTES
Colon and Rectal Surgery   Crystal Dickinson 49 y.o. female MRN [id number]  Encounter: [id number]  01/06/25 1:51 PM            Assessment: Crystal Dickinson is a 49 y.o. female who had sigmoid carcinoma in situ.      Plan:   Cancer of sigmoid colon (HCC)  The patient presents for evaluation of carcinoma in the sigmoid colon.  This was present and polyp that was removed in its entirety.  The pathology report shows intramucosal adenocarcinoma suspicion and definite high-grade dysplasia.  No invasion is noted.  The polyp stalk was negative for tumor and there is no likelihood of lymphatic spread given the pathological description of the polyp.      I reviewed the patient's colonoscopy report which states clearly that the lesions were removed in their entirety, en bloc.    I reassured the patient that this set of polypectomies is curative and that the lesion of suspicion should not have any potential for spread.  I agree with the plan for follow-up colonoscopy within a year.  Thereafter, consideration could be given to keeping colonoscopy at least every 3 years given her young age at presentation.    Because of the patient's young age and presentation with colon cancer, I recommend genetic liaison counseling.  I am not sure whether or not she fits criteria for genetic testing.  Her age certainly speaks to testing.  I explained the potential benefits including close for evaluation for gynecological tumors, other gastrointestinal tumors, etc., as well as the potential benefits to her family members.  She understands and agrees.      Subjective     HPI    Crystal Dickinson is a 49 y.o. female who is referred today by Dr. 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     The patient states she is doing well; denies any abdominal pain, rectal bleeding, nausea/emesis or unexpected weight loss.     Last colonoscopy performed on 12/5/2024 by Dr. Gordon, showed: 1) One 10 mm Amna Is polyp in the distal  rectum; performed hot snare removal.  2) One 7 mm Amna Ip polyp in the mid sigmoid colon; performed cold snare removal.  3) One 3 mm Amna Is polyp; tissue was ablated with tip of snare.  4) Melanosis in the transverse colon, descending colon and sigmoid colon.  5) Small (grade 1) hemorrhoid.  6 month recall.     Colonoscopy pathology:  A. Large Intestine, sigmoid colon, bx r/o melanosis:    - Benign colonic mucosa with melanosis coli. See note.       - Negative for acute or chronic colitis, granuloma or dysplasia.   B. Large Intestine, sigmoid colon polyp CS:    - At least extensive high grade dysplasia, cannot rule out intramucosal adenocarcinoma, arising in tubulovillous adenoma. See note.     - Stalk is negative for dysplasia.    C. Colon, Rectal polyp Hot snare:    - Tubulovillous adenoma     Historical Information   Past Medical History:   Diagnosis Date    Abdominal pain     Anxiety     Dizziness     Lightheadedness     Palpitations     SOB (shortness of breath)      Past Surgical History:   Procedure Laterality Date    DXA PROCEDURE (HISTORICAL)  06/16/2020       Meds/Allergies       Current Outpatient Medications:     busPIRone (BUSPAR) 15 mg tablet, ONE TAB 3 TIMES A DAY, Disp: 270 tablet, Rfl: 1    DULoxetine (CYMBALTA) 30 mg delayed release capsule, ONE CAPSULE DAILY, Disp: 90 capsule, Rfl: 1    DULoxetine (CYMBALTA) 60 mg delayed release capsule, ONE CAPSULE DAILY, Disp: 90 capsule, Rfl: 1  No Known Allergies    Social History   Social History     Substance and Sexual Activity   Drug Use No     Social History     Tobacco Use   Smoking Status Never   Smokeless Tobacco Never         Family History   Problem Relation Age of Onset    Diabetes Father     No Known Problems Brother     No Known Problems Brother     No Known Problems Son     No Known Problems Son     No Known Problems Paternal Uncle          Review of Systems   Constitutional: Negative.    Respiratory: Negative.     Cardiovascular: Negative.   "      Objective   Current Vitals:  Vitals:    01/06/25 1321   BP: 106/60   Weight: 52.6 kg (116 lb)   Height: 5' 1\" (1.549 m)         Physical Exam  Constitutional:       Appearance: Normal appearance.   Neurological:      Mental Status: She is alert.               "

## 2025-01-06 ENCOUNTER — OFFICE VISIT (OUTPATIENT)
Age: 50
End: 2025-01-06
Payer: COMMERCIAL

## 2025-01-06 VITALS
BODY MASS INDEX: 21.9 KG/M2 | WEIGHT: 116 LBS | DIASTOLIC BLOOD PRESSURE: 60 MMHG | SYSTOLIC BLOOD PRESSURE: 106 MMHG | HEIGHT: 61 IN

## 2025-01-06 DIAGNOSIS — D09.9 ADENOCARCINOMA IN SITU IN TUBULOVILLOUS ADENOMA: ICD-10-CM

## 2025-01-06 DIAGNOSIS — C18.7 CANCER OF SIGMOID COLON (HCC): Primary | ICD-10-CM

## 2025-01-06 PROCEDURE — 99203 OFFICE O/P NEW LOW 30 MIN: CPT | Performed by: COLON & RECTAL SURGERY

## 2025-01-06 NOTE — ASSESSMENT & PLAN NOTE
The patient presents for evaluation of carcinoma in the sigmoid colon.  This was present and polyp that was removed in its entirety.  The pathology report shows intramucosal adenocarcinoma suspicion and definite high-grade dysplasia.  No invasion is noted.  The polyp stalk was negative for tumor and there is no likelihood of lymphatic spread given the pathological description of the polyp.      I reviewed the patient's colonoscopy report which states clearly that the lesions were removed in their entirety, en bloc.    I reassured the patient that this set of polypectomies is curative and that the lesion of suspicion should not have any potential for spread.  I agree with the plan for follow-up colonoscopy within a year.  Thereafter, consideration could be given to keeping colonoscopy at least every 3 years given her young age at presentation.    Because of the patient's young age and presentation with colon cancer, I recommend genetic liaison counseling.  I am not sure whether or not she fits criteria for genetic testing.  Her age certainly speaks to testing.  I explained the potential benefits including close for evaluation for gynecological tumors, other gastrointestinal tumors, etc., as well as the potential benefits to her family members.  She understands and agrees.

## 2025-01-09 DIAGNOSIS — F41.0 GENERALIZED ANXIETY DISORDER WITH PANIC ATTACKS: ICD-10-CM

## 2025-01-09 DIAGNOSIS — F41.1 GENERALIZED ANXIETY DISORDER WITH PANIC ATTACKS: ICD-10-CM

## 2025-01-10 RX ORDER — BUSPIRONE HYDROCHLORIDE 15 MG/1
TABLET ORAL
Qty: 270 TABLET | Refills: 1 | Status: SHIPPED | OUTPATIENT
Start: 2025-01-10

## 2025-01-28 ENCOUNTER — TELEPHONE (OUTPATIENT)
Dept: GASTROENTEROLOGY | Facility: CLINIC | Age: 50
End: 2025-01-28

## 2025-02-03 NOTE — TELEPHONE ENCOUNTER
Order created.  Pt would be due for 6 mo repeat on or after 6/5/25. I lmom for pt to please call back to schedule. Will call again if do not hear back from pt.

## 2025-02-03 NOTE — TELEPHONE ENCOUNTER
Patient returned call to schedule follow up colonoscopy.    Scheduled date of colonoscopy (as of today): 6/24/25  Physician performing colonoscopy:   Location of colonoscopy: [facility]  Bowel prep reviewed with patient: Miralax Dulcoalx prep instructions reviewed and sent via email.   Instructions reviewed with patient by: md  Clearances:

## 2025-02-26 DIAGNOSIS — F32.5 MAJOR DEPRESSIVE DISORDER WITH SINGLE EPISODE, IN FULL REMISSION (HCC): ICD-10-CM

## 2025-02-26 NOTE — TELEPHONE ENCOUNTER
Reason for call:   [x] Refill   [] Prior Auth  [] Other:     Office:   [x] PCP/Provider -   [] Specialty/Provider -     Medication: DULoxetine (CYMBALTA) 60 mg delayed release capsule     Dose/Frequency: ONE CAPSULE DAILY,     Quantity: 90 capsule     Pharmacy: [email or web address] [address]     Does the patient have enough for 3 days?   [] Yes   [x] No - Send as HP to POD

## 2025-02-27 RX ORDER — DULOXETIN HYDROCHLORIDE 60 MG/1
60 CAPSULE, DELAYED RELEASE ORAL DAILY
Qty: 90 CAPSULE | Refills: 0 | Status: SHIPPED | OUTPATIENT
Start: 2025-02-27

## 2025-05-16 DIAGNOSIS — F32.5 MAJOR DEPRESSIVE DISORDER WITH SINGLE EPISODE, IN FULL REMISSION (HCC): ICD-10-CM

## 2025-05-16 RX ORDER — DULOXETIN HYDROCHLORIDE 30 MG/1
CAPSULE, DELAYED RELEASE ORAL
Qty: 90 CAPSULE | Refills: 1 | Status: SHIPPED | OUTPATIENT
Start: 2025-05-16

## 2025-05-23 DIAGNOSIS — F32.5 MAJOR DEPRESSIVE DISORDER WITH SINGLE EPISODE, IN FULL REMISSION (HCC): ICD-10-CM

## 2025-05-25 RX ORDER — DULOXETIN HYDROCHLORIDE 60 MG/1
60 CAPSULE, DELAYED RELEASE ORAL DAILY
Qty: 90 CAPSULE | Refills: 1 | Status: SHIPPED | OUTPATIENT
Start: 2025-05-25

## 2025-06-09 ENCOUNTER — ANESTHESIA (OUTPATIENT)
Dept: ANESTHESIOLOGY | Facility: HOSPITAL | Age: 50
End: 2025-06-09

## 2025-06-09 ENCOUNTER — ANESTHESIA EVENT (OUTPATIENT)
Dept: ANESTHESIOLOGY | Facility: HOSPITAL | Age: 50
End: 2025-06-09

## 2025-06-19 ENCOUNTER — TELEPHONE (OUTPATIENT)
Dept: GASTROENTEROLOGY | Facility: CLINIC | Age: 50
End: 2025-06-19

## 2025-06-19 NOTE — TELEPHONE ENCOUNTER
lmom confirming pt's colonoscopy  scheduled on 6/24/25 at [facility] with Dr Haywood.  Informed [facility] would be calling the day prior with the arrival time. Informed of clear liquid diet day prior as well as the bowel cleansing preparation.  Informed would need a  the day of the procedure due to being under sedation. Informed pt instructions are in my chart for review. I asked pt to please call back if has any questions.

## 2025-06-24 ENCOUNTER — ANESTHESIA EVENT (OUTPATIENT)
Dept: GASTROENTEROLOGY | Facility: AMBULARY SURGERY CENTER | Age: 50
End: 2025-06-24
Payer: COMMERCIAL

## 2025-06-24 ENCOUNTER — HOSPITAL ENCOUNTER (OUTPATIENT)
Dept: GASTROENTEROLOGY | Facility: AMBULARY SURGERY CENTER | Age: 50
Setting detail: OUTPATIENT SURGERY
Discharge: HOME | End: 2025-06-24
Attending: INTERNAL MEDICINE
Payer: COMMERCIAL

## 2025-06-24 VITALS
HEART RATE: 67 BPM | DIASTOLIC BLOOD PRESSURE: 67 MMHG | RESPIRATION RATE: 18 BRPM | TEMPERATURE: 97.4 F | OXYGEN SATURATION: 100 % | SYSTOLIC BLOOD PRESSURE: 108 MMHG

## 2025-06-24 DIAGNOSIS — C18.7 ADENOCARCINOMA OF SIGMOID COLON (HCC): ICD-10-CM

## 2025-06-24 LAB
EXT PREGNANCY TEST URINE: NEGATIVE
EXT. CONTROL: NORMAL

## 2025-06-24 PROCEDURE — 81025 URINE PREGNANCY TEST: CPT | Performed by: INTERNAL MEDICINE

## 2025-06-24 PROCEDURE — 88305 TISSUE EXAM BY PATHOLOGIST: CPT | Performed by: PATHOLOGY

## 2025-06-24 PROCEDURE — 45380 COLONOSCOPY AND BIOPSY: CPT | Performed by: INTERNAL MEDICINE

## 2025-06-24 PROCEDURE — 45385 COLONOSCOPY W/LESION REMOVAL: CPT | Performed by: INTERNAL MEDICINE

## 2025-06-24 RX ORDER — SODIUM CHLORIDE, SODIUM LACTATE, POTASSIUM CHLORIDE, CALCIUM CHLORIDE 600; 310; 30; 20 MG/100ML; MG/100ML; MG/100ML; MG/100ML
INJECTION, SOLUTION INTRAVENOUS CONTINUOUS PRN
Status: DISCONTINUED | OUTPATIENT
Start: 2025-06-24 | End: 2025-06-24

## 2025-06-24 RX ORDER — LIDOCAINE HYDROCHLORIDE 10 MG/ML
INJECTION, SOLUTION EPIDURAL; INFILTRATION; INTRACAUDAL; PERINEURAL AS NEEDED
Status: DISCONTINUED | OUTPATIENT
Start: 2025-06-24 | End: 2025-06-24

## 2025-06-24 RX ORDER — SODIUM CHLORIDE, SODIUM LACTATE, POTASSIUM CHLORIDE, CALCIUM CHLORIDE 600; 310; 30; 20 MG/100ML; MG/100ML; MG/100ML; MG/100ML
125 INJECTION, SOLUTION INTRAVENOUS CONTINUOUS
Status: DISCONTINUED | OUTPATIENT
Start: 2025-06-24 | End: 2025-06-28 | Stop reason: HOSPADM

## 2025-06-24 RX ORDER — PROPOFOL 10 MG/ML
INJECTION, EMULSION INTRAVENOUS CONTINUOUS PRN
Status: DISCONTINUED | OUTPATIENT
Start: 2025-06-24 | End: 2025-06-24

## 2025-06-24 RX ORDER — PROPOFOL 10 MG/ML
INJECTION, EMULSION INTRAVENOUS AS NEEDED
Status: DISCONTINUED | OUTPATIENT
Start: 2025-06-24 | End: 2025-06-24

## 2025-06-24 RX ADMIN — PROPOFOL 100 MG: 10 INJECTION, EMULSION INTRAVENOUS at 07:33

## 2025-06-24 RX ADMIN — PROPOFOL 120 MCG/KG/MIN: 10 INJECTION, EMULSION INTRAVENOUS at 07:33

## 2025-06-24 RX ADMIN — LIDOCAINE HYDROCHLORIDE 50 MG: 10 INJECTION, SOLUTION EPIDURAL; INFILTRATION; INTRACAUDAL; PERINEURAL at 07:33

## 2025-06-24 RX ADMIN — SODIUM CHLORIDE, SODIUM LACTATE, POTASSIUM CHLORIDE, AND CALCIUM CHLORIDE: .6; .31; .03; .02 INJECTION, SOLUTION INTRAVENOUS at 07:30

## 2025-06-24 NOTE — H&P
History and Physical -  [facility]  Crystal Dickinson 49 y.o. female MRN: [id number]                  HPI: Crystal Dickinson is a 49 y.o. year old female who presents for history of polyp      REVIEW OF SYSTEMS: Per the HPI, and otherwise unremarkable.    Historical Information   Past Medical History[1]  Past Surgical History[2]  Social History   Social History     Substance and Sexual Activity   Alcohol Use No     Social History     Substance and Sexual Activity   Drug Use No     Tobacco Use History[3]  Family History[4]    Meds/Allergies     Current Medications[5]    Allergies[6]    Objective     /61   Pulse 72   Temp (!) 97.4 °F (36.3 °C) (Temporal)   Resp 18   LMP 07/27/2021   SpO2 100%       PHYSICAL EXAM    Gen: NAD  Head: NCAT  CV: RRR  CHEST: Clear  ABD: soft, NT/ND  EXT: no edema      ASSESSMENT/PLAN:  This is a 49 y.o. year old female here for colonoscopy, and she is stable and optimized for her procedure.             [1]   Past Medical History:  Diagnosis Date    Abdominal pain     Anxiety     Dizziness     Lightheadedness     Palpitations     SOB (shortness of breath)    [2]   Past Surgical History:  Procedure Laterality Date    DXA PROCEDURE (HISTORICAL)  06/16/2020   [3]   Social History  Tobacco Use   Smoking Status Never   Smokeless Tobacco Never   [4]   Family History  Problem Relation Name Age of Onset    Diabetes Father      No Known Problems Brother      No Known Problems Brother      No Known Problems Son      No Known Problems Son      No Known Problems Paternal Uncle     [5]   Current Outpatient Medications:     busPIRone (BUSPAR) 15 mg tablet    DULoxetine (CYMBALTA) 30 mg delayed release capsule    DULoxetine (CYMBALTA) 60 mg delayed release capsule    Current Facility-Administered Medications:     lactated ringers infusion, 125 mL/hr, Intravenous, Continuous  [6] No Known Allergies

## 2025-06-24 NOTE — ANESTHESIA PREPROCEDURE EVALUATION
Procedure:  COLONOSCOPY    Relevant Problems   GI/HEPATIC   (+) Cancer of sigmoid colon (HCC)      NEURO/PSYCH   (+) Anxiety   (+) Generalized anxiety disorder with panic attacks   (+) Major depressive disorder with single episode, in full remission (HCC)        Physical Exam    Airway     Mallampati score: II  TM Distance: >3 FB  Neck ROM: full      Cardiovascular  Cardiovascular exam normal    Dental   Comment: Denies loose teeth     Pulmonary  Pulmonary exam normal     Neurological    She appears awake, alert and oriented x3.      Other Findings  post-pubertal.      Anesthesia Plan  ASA Score- 2     Anesthesia Type- IV sedation with anesthesia with ASA Monitors.         Additional Monitors:     Airway Plan:            Plan Factors-Exercise tolerance (METS): >4 METS.    Chart reviewed.   Existing labs reviewed. Patient summary reviewed.    Patient is not a current smoker.              Induction- intravenous.    Postoperative Plan- .   Monitoring Plan - Monitoring plan - standard ASA monitoring          Informed Consent- Anesthetic plan and risks discussed with patient.  I personally reviewed this patient with the CRNA. Discussed and agreed on the Anesthesia Plan with the CRNA..      NPO Status:  Vitals Value Taken Time   Date of last liquid 06/23/25 06/24/25 07:04   Time of last liquid 2300 06/24/25 07:04   Date of last solid 06/22/25 06/24/25 07:04   Time of last solid 1800 06/24/25 07:04

## 2025-06-24 NOTE — ANESTHESIA POSTPROCEDURE EVALUATION
Post-Op Assessment Note    CV Status:  Stable  Pain Score: 0    Pain management: adequate       Mental Status:  Awake   Hydration Status:  Stable   PONV Controlled:  None   Airway Patency:  Patent     Post Op Vitals Reviewed: Yes    No anethesia notable event occurred.    Staff: Anesthesiologist, CRNA           Last Filed PACU Vitals:  Vitals Value Taken Time   Temp     Pulse 71    BP 98/60    Resp 14    SpO2 99

## 2025-06-27 PROCEDURE — 88305 TISSUE EXAM BY PATHOLOGIST: CPT | Performed by: PATHOLOGY
